# Patient Record
Sex: MALE | Race: WHITE | Employment: FULL TIME | URBAN - METROPOLITAN AREA
[De-identification: names, ages, dates, MRNs, and addresses within clinical notes are randomized per-mention and may not be internally consistent; named-entity substitution may affect disease eponyms.]

---

## 2017-09-07 ENCOUNTER — OFFICE VISIT (OUTPATIENT)
Dept: FAMILY MEDICINE CLINIC | Age: 72
End: 2017-09-07

## 2017-09-07 VITALS
DIASTOLIC BLOOD PRESSURE: 68 MMHG | WEIGHT: 196 LBS | OXYGEN SATURATION: 98 % | BODY MASS INDEX: 26.55 KG/M2 | HEIGHT: 72 IN | HEART RATE: 75 BPM | SYSTOLIC BLOOD PRESSURE: 116 MMHG

## 2017-09-07 DIAGNOSIS — I25.10 ASCVD (ARTERIOSCLEROTIC CARDIOVASCULAR DISEASE): ICD-10-CM

## 2017-09-07 DIAGNOSIS — E78.5 HYPERLIPIDEMIA, UNSPECIFIED HYPERLIPIDEMIA TYPE: Primary | ICD-10-CM

## 2017-09-07 DIAGNOSIS — N40.0 BENIGN PROSTATIC HYPERPLASIA WITHOUT LOWER URINARY TRACT SYMPTOMS, UNSPECIFIED MORPHOLOGY: ICD-10-CM

## 2017-09-07 DIAGNOSIS — Z11.59 ENCOUNTER FOR HEPATITIS C SCREENING TEST FOR LOW RISK PATIENT: ICD-10-CM

## 2017-09-07 DIAGNOSIS — I10 ESSENTIAL HYPERTENSION: ICD-10-CM

## 2017-09-07 DIAGNOSIS — R73.01 ELEVATED FASTING GLUCOSE: ICD-10-CM

## 2017-09-07 LAB
A/G RATIO: 2 (ref 1.1–2.2)
ALBUMIN SERPL-MCNC: 4.2 G/DL (ref 3.4–5)
ALP BLD-CCNC: 74 U/L (ref 40–129)
ALT SERPL-CCNC: 25 U/L (ref 10–40)
ANION GAP SERPL CALCULATED.3IONS-SCNC: 14 MMOL/L (ref 3–16)
AST SERPL-CCNC: 22 U/L (ref 15–37)
BILIRUB SERPL-MCNC: <0.2 MG/DL (ref 0–1)
BUN BLDV-MCNC: 34 MG/DL (ref 7–20)
CALCIUM SERPL-MCNC: 9.2 MG/DL (ref 8.3–10.6)
CHLORIDE BLD-SCNC: 104 MMOL/L (ref 99–110)
CHOLESTEROL, TOTAL: 168 MG/DL (ref 0–199)
CO2: 23 MMOL/L (ref 21–32)
CREAT SERPL-MCNC: 1.1 MG/DL (ref 0.8–1.3)
GFR AFRICAN AMERICAN: >60
GFR NON-AFRICAN AMERICAN: >60
GLOBULIN: 2.1 G/DL
GLUCOSE BLD-MCNC: 113 MG/DL (ref 70–99)
HAV IGM SER IA-ACNC: NORMAL
HDLC SERPL-MCNC: 67 MG/DL (ref 40–60)
HEPATITIS B CORE IGM ANTIBODY: NORMAL
HEPATITIS B SURFACE ANTIGEN INTERPRETATION: NORMAL
HEPATITIS C ANTIBODY INTERPRETATION: NORMAL
LDL CHOLESTEROL CALCULATED: 88 MG/DL
POTASSIUM SERPL-SCNC: 5.2 MMOL/L (ref 3.5–5.1)
SODIUM BLD-SCNC: 141 MMOL/L (ref 136–145)
TOTAL PROTEIN: 6.3 G/DL (ref 6.4–8.2)
TRIGL SERPL-MCNC: 63 MG/DL (ref 0–150)
VLDLC SERPL CALC-MCNC: 13 MG/DL

## 2017-09-07 PROCEDURE — 1123F ACP DISCUSS/DSCN MKR DOCD: CPT | Performed by: FAMILY MEDICINE

## 2017-09-07 PROCEDURE — 1036F TOBACCO NON-USER: CPT | Performed by: FAMILY MEDICINE

## 2017-09-07 PROCEDURE — 3017F COLORECTAL CA SCREEN DOC REV: CPT | Performed by: FAMILY MEDICINE

## 2017-09-07 PROCEDURE — G8419 CALC BMI OUT NRM PARAM NOF/U: HCPCS | Performed by: FAMILY MEDICINE

## 2017-09-07 PROCEDURE — 36415 COLL VENOUS BLD VENIPUNCTURE: CPT | Performed by: FAMILY MEDICINE

## 2017-09-07 PROCEDURE — G8598 ASA/ANTIPLAT THER USED: HCPCS | Performed by: FAMILY MEDICINE

## 2017-09-07 PROCEDURE — G8427 DOCREV CUR MEDS BY ELIG CLIN: HCPCS | Performed by: FAMILY MEDICINE

## 2017-09-07 PROCEDURE — 99214 OFFICE O/P EST MOD 30 MIN: CPT | Performed by: FAMILY MEDICINE

## 2017-09-07 PROCEDURE — 4040F PNEUMOC VAC/ADMIN/RCVD: CPT | Performed by: FAMILY MEDICINE

## 2017-09-07 RX ORDER — LISINOPRIL 40 MG/1
TABLET ORAL
Qty: 30 TABLET | Refills: 11 | Status: SHIPPED | OUTPATIENT
Start: 2017-09-07 | End: 2018-09-11 | Stop reason: SDUPTHER

## 2017-09-07 ASSESSMENT — PATIENT HEALTH QUESTIONNAIRE - PHQ9
1. LITTLE INTEREST OR PLEASURE IN DOING THINGS: 0
SUM OF ALL RESPONSES TO PHQ9 QUESTIONS 1 & 2: 0
2. FEELING DOWN, DEPRESSED OR HOPELESS: 0
SUM OF ALL RESPONSES TO PHQ QUESTIONS 1-9: 0

## 2017-09-08 LAB
ESTIMATED AVERAGE GLUCOSE: 99.7 MG/DL
HBA1C MFR BLD: 5.1 %

## 2017-09-12 ASSESSMENT — ENCOUNTER SYMPTOMS
COUGH: 0
EYE DISCHARGE: 0
DIARRHEA: 0
BLOOD IN STOOL: 0
ABDOMINAL PAIN: 0
ABDOMINAL DISTENTION: 0
CHEST TIGHTNESS: 0
CONSTIPATION: 0
SHORTNESS OF BREATH: 0
ANAL BLEEDING: 0

## 2017-09-18 ENCOUNTER — NURSE ONLY (OUTPATIENT)
Dept: FAMILY MEDICINE CLINIC | Age: 72
End: 2017-09-18

## 2017-09-18 DIAGNOSIS — E87.5 SERUM POTASSIUM ELEVATED: Primary | ICD-10-CM

## 2017-09-18 LAB
ANION GAP SERPL CALCULATED.3IONS-SCNC: 13 MMOL/L (ref 3–16)
BUN BLDV-MCNC: 39 MG/DL (ref 7–20)
CALCIUM SERPL-MCNC: 8.8 MG/DL (ref 8.3–10.6)
CHLORIDE BLD-SCNC: 104 MMOL/L (ref 99–110)
CO2: 24 MMOL/L (ref 21–32)
CREAT SERPL-MCNC: 1 MG/DL (ref 0.8–1.3)
GFR AFRICAN AMERICAN: >60
GFR NON-AFRICAN AMERICAN: >60
GLUCOSE BLD-MCNC: 111 MG/DL (ref 70–99)
POTASSIUM SERPL-SCNC: 4.5 MMOL/L (ref 3.5–5.1)
SODIUM BLD-SCNC: 141 MMOL/L (ref 136–145)

## 2017-09-18 PROCEDURE — 36415 COLL VENOUS BLD VENIPUNCTURE: CPT | Performed by: FAMILY MEDICINE

## 2018-04-04 ENCOUNTER — OFFICE VISIT (OUTPATIENT)
Dept: FAMILY MEDICINE CLINIC | Age: 73
End: 2018-04-04

## 2018-04-04 VITALS
WEIGHT: 203 LBS | TEMPERATURE: 98.1 F | BODY MASS INDEX: 27.53 KG/M2 | DIASTOLIC BLOOD PRESSURE: 74 MMHG | HEART RATE: 74 BPM | SYSTOLIC BLOOD PRESSURE: 116 MMHG

## 2018-04-04 DIAGNOSIS — Z01.818 PREOP EXAMINATION: Primary | ICD-10-CM

## 2018-04-04 PROCEDURE — 99213 OFFICE O/P EST LOW 20 MIN: CPT | Performed by: NURSE PRACTITIONER

## 2018-04-04 PROCEDURE — 3017F COLORECTAL CA SCREEN DOC REV: CPT | Performed by: NURSE PRACTITIONER

## 2018-04-04 PROCEDURE — G8427 DOCREV CUR MEDS BY ELIG CLIN: HCPCS | Performed by: NURSE PRACTITIONER

## 2018-04-04 PROCEDURE — G8419 CALC BMI OUT NRM PARAM NOF/U: HCPCS | Performed by: NURSE PRACTITIONER

## 2018-04-19 ENCOUNTER — HOSPITAL ENCOUNTER (OUTPATIENT)
Dept: SURGERY | Age: 73
Discharge: HOME | End: 2018-04-19
Attending: OPHTHALMOLOGY | Admitting: OPHTHALMOLOGY

## 2018-04-19 VITALS
HEIGHT: 72 IN | DIASTOLIC BLOOD PRESSURE: 79 MMHG | SYSTOLIC BLOOD PRESSURE: 142 MMHG | TEMPERATURE: 98.2 F | BODY MASS INDEX: 27.5 KG/M2 | HEART RATE: 64 BPM | RESPIRATION RATE: 14 BRPM | WEIGHT: 203 LBS | OXYGEN SATURATION: 96 %

## 2018-04-19 RX ORDER — TOBRAMYCIN AND DEXAMETHASONE 3; 1 MG/ML; MG/ML
1 SUSPENSION/ DROPS OPHTHALMIC SEE ADMIN INSTRUCTIONS
Status: DISCONTINUED | OUTPATIENT
Start: 2018-04-19 | End: 2018-04-20 | Stop reason: HOSPADM

## 2018-04-19 RX ORDER — SODIUM CHLORIDE 0.9 % (FLUSH) 0.9 %
10 SYRINGE (ML) INJECTION PRN
Status: DISCONTINUED | OUTPATIENT
Start: 2018-04-19 | End: 2018-04-20 | Stop reason: HOSPADM

## 2018-04-19 RX ORDER — LIDOCAINE HYDROCHLORIDE 10 MG/ML
0.3 INJECTION, SOLUTION EPIDURAL; INFILTRATION; INTRACAUDAL; PERINEURAL
Status: COMPLETED | OUTPATIENT
Start: 2018-04-19 | End: 2018-04-19

## 2018-04-19 RX ORDER — SODIUM CHLORIDE 0.9 % (FLUSH) 0.9 %
10 SYRINGE (ML) INJECTION EVERY 12 HOURS SCHEDULED
Status: DISCONTINUED | OUTPATIENT
Start: 2018-04-19 | End: 2018-04-20 | Stop reason: HOSPADM

## 2018-04-19 RX ORDER — PREDNISOLONE ACETATE 10 MG/ML
1 SUSPENSION/ DROPS OPHTHALMIC SEE ADMIN INSTRUCTIONS
Status: DISCONTINUED | OUTPATIENT
Start: 2018-04-19 | End: 2018-04-20 | Stop reason: HOSPADM

## 2018-04-19 RX ORDER — SODIUM CHLORIDE, SODIUM LACTATE, POTASSIUM CHLORIDE, CALCIUM CHLORIDE 600; 310; 30; 20 MG/100ML; MG/100ML; MG/100ML; MG/100ML
INJECTION, SOLUTION INTRAVENOUS CONTINUOUS
Status: DISCONTINUED | OUTPATIENT
Start: 2018-04-19 | End: 2018-04-20 | Stop reason: HOSPADM

## 2018-04-19 RX ADMIN — SODIUM CHLORIDE, SODIUM LACTATE, POTASSIUM CHLORIDE, CALCIUM CHLORIDE: 600; 310; 30; 20 INJECTION, SOLUTION INTRAVENOUS at 11:12

## 2018-04-19 RX ADMIN — LIDOCAINE HYDROCHLORIDE 0.1 ML: 10 INJECTION, SOLUTION EPIDURAL; INFILTRATION; INTRACAUDAL; PERINEURAL at 11:12

## 2018-04-19 ASSESSMENT — PAIN - FUNCTIONAL ASSESSMENT: PAIN_FUNCTIONAL_ASSESSMENT: 0-10

## 2018-04-19 ASSESSMENT — PAIN SCALES - GENERAL
PAINLEVEL_OUTOF10: 0
PAINLEVEL_OUTOF10: 0

## 2018-05-17 ENCOUNTER — HOSPITAL ENCOUNTER (OUTPATIENT)
Dept: SURGERY | Age: 73
Discharge: HOME | End: 2018-05-17
Attending: OPHTHALMOLOGY | Admitting: OPHTHALMOLOGY

## 2018-05-17 VITALS
OXYGEN SATURATION: 99 % | HEIGHT: 72 IN | RESPIRATION RATE: 18 BRPM | WEIGHT: 203 LBS | BODY MASS INDEX: 27.5 KG/M2 | SYSTOLIC BLOOD PRESSURE: 113 MMHG | HEART RATE: 61 BPM | DIASTOLIC BLOOD PRESSURE: 67 MMHG | TEMPERATURE: 97 F

## 2018-05-17 RX ORDER — SODIUM CHLORIDE, SODIUM LACTATE, POTASSIUM CHLORIDE, CALCIUM CHLORIDE 600; 310; 30; 20 MG/100ML; MG/100ML; MG/100ML; MG/100ML
INJECTION, SOLUTION INTRAVENOUS CONTINUOUS
Status: DISCONTINUED | OUTPATIENT
Start: 2018-05-17 | End: 2018-05-18 | Stop reason: HOSPADM

## 2018-05-17 RX ORDER — MORPHINE SULFATE 10 MG/ML
2 INJECTION, SOLUTION INTRAMUSCULAR; INTRAVENOUS EVERY 5 MIN PRN
Status: DISCONTINUED | OUTPATIENT
Start: 2018-05-17 | End: 2018-05-18 | Stop reason: HOSPADM

## 2018-05-17 RX ORDER — MEPERIDINE HYDROCHLORIDE 25 MG/ML
12.5 INJECTION INTRAMUSCULAR; INTRAVENOUS; SUBCUTANEOUS EVERY 5 MIN PRN
Status: DISCONTINUED | OUTPATIENT
Start: 2018-05-17 | End: 2018-05-18 | Stop reason: HOSPADM

## 2018-05-17 RX ORDER — DIPHENHYDRAMINE HYDROCHLORIDE 50 MG/ML
12.5 INJECTION INTRAMUSCULAR; INTRAVENOUS
Status: ACTIVE | OUTPATIENT
Start: 2018-05-17 | End: 2018-05-17

## 2018-05-17 RX ORDER — TOBRAMYCIN AND DEXAMETHASONE 3; 1 MG/ML; MG/ML
1 SUSPENSION/ DROPS OPHTHALMIC SEE ADMIN INSTRUCTIONS
Status: DISCONTINUED | OUTPATIENT
Start: 2018-05-17 | End: 2018-05-18 | Stop reason: HOSPADM

## 2018-05-17 RX ORDER — OXYCODONE HYDROCHLORIDE AND ACETAMINOPHEN 5; 325 MG/1; MG/1
2 TABLET ORAL PRN
Status: ACTIVE | OUTPATIENT
Start: 2018-05-17 | End: 2018-05-17

## 2018-05-17 RX ORDER — LIDOCAINE HYDROCHLORIDE 10 MG/ML
1 INJECTION, SOLUTION EPIDURAL; INFILTRATION; INTRACAUDAL; PERINEURAL
Status: COMPLETED | OUTPATIENT
Start: 2018-05-17 | End: 2018-05-17

## 2018-05-17 RX ORDER — HYDRALAZINE HYDROCHLORIDE 20 MG/ML
5 INJECTION INTRAMUSCULAR; INTRAVENOUS EVERY 10 MIN PRN
Status: DISCONTINUED | OUTPATIENT
Start: 2018-05-17 | End: 2018-05-18 | Stop reason: HOSPADM

## 2018-05-17 RX ORDER — PROMETHAZINE HYDROCHLORIDE 25 MG/ML
6.25 INJECTION, SOLUTION INTRAMUSCULAR; INTRAVENOUS
Status: DISCONTINUED | OUTPATIENT
Start: 2018-05-17 | End: 2018-05-18 | Stop reason: HOSPADM

## 2018-05-17 RX ORDER — MORPHINE SULFATE 4 MG/ML
1 INJECTION, SOLUTION INTRAMUSCULAR; INTRAVENOUS EVERY 5 MIN PRN
Status: DISCONTINUED | OUTPATIENT
Start: 2018-05-17 | End: 2018-05-18 | Stop reason: HOSPADM

## 2018-05-17 RX ORDER — HYDROMORPHONE HCL 110MG/55ML
0.25 PATIENT CONTROLLED ANALGESIA SYRINGE INTRAVENOUS EVERY 5 MIN PRN
Status: DISCONTINUED | OUTPATIENT
Start: 2018-05-17 | End: 2018-05-18 | Stop reason: HOSPADM

## 2018-05-17 RX ORDER — HYDROMORPHONE HCL 110MG/55ML
0.5 PATIENT CONTROLLED ANALGESIA SYRINGE INTRAVENOUS EVERY 5 MIN PRN
Status: DISCONTINUED | OUTPATIENT
Start: 2018-05-17 | End: 2018-05-18 | Stop reason: HOSPADM

## 2018-05-17 RX ORDER — LABETALOL HYDROCHLORIDE 5 MG/ML
5 INJECTION, SOLUTION INTRAVENOUS EVERY 10 MIN PRN
Status: DISCONTINUED | OUTPATIENT
Start: 2018-05-17 | End: 2018-05-18 | Stop reason: HOSPADM

## 2018-05-17 RX ORDER — OXYCODONE HYDROCHLORIDE AND ACETAMINOPHEN 5; 325 MG/1; MG/1
1 TABLET ORAL PRN
Status: ACTIVE | OUTPATIENT
Start: 2018-05-17 | End: 2018-05-17

## 2018-05-17 RX ORDER — SODIUM CHLORIDE, SODIUM LACTATE, POTASSIUM CHLORIDE, CALCIUM CHLORIDE 600; 310; 30; 20 MG/100ML; MG/100ML; MG/100ML; MG/100ML
INJECTION, SOLUTION INTRAVENOUS
Status: DISPENSED
Start: 2018-05-17 | End: 2018-05-17

## 2018-05-17 RX ORDER — ONDANSETRON 2 MG/ML
4 INJECTION INTRAMUSCULAR; INTRAVENOUS PRN
Status: DISCONTINUED | OUTPATIENT
Start: 2018-05-17 | End: 2018-05-18 | Stop reason: HOSPADM

## 2018-05-17 RX ORDER — PREDNISOLONE ACETATE 10 MG/ML
1 SUSPENSION/ DROPS OPHTHALMIC SEE ADMIN INSTRUCTIONS
Status: DISCONTINUED | OUTPATIENT
Start: 2018-05-17 | End: 2018-05-18 | Stop reason: HOSPADM

## 2018-05-17 RX ADMIN — SODIUM CHLORIDE, SODIUM LACTATE, POTASSIUM CHLORIDE, CALCIUM CHLORIDE: 600; 310; 30; 20 INJECTION, SOLUTION INTRAVENOUS at 09:19

## 2018-05-17 RX ADMIN — LIDOCAINE HYDROCHLORIDE 1 ML: 10 INJECTION, SOLUTION EPIDURAL; INFILTRATION; INTRACAUDAL; PERINEURAL at 09:19

## 2018-05-17 ASSESSMENT — PAIN SCALES - GENERAL
PAINLEVEL_OUTOF10: 0
PAINLEVEL_OUTOF10: 0

## 2018-05-17 ASSESSMENT — PAIN - FUNCTIONAL ASSESSMENT: PAIN_FUNCTIONAL_ASSESSMENT: 0-10

## 2018-09-11 ENCOUNTER — OFFICE VISIT (OUTPATIENT)
Dept: FAMILY MEDICINE CLINIC | Age: 73
End: 2018-09-11

## 2018-09-11 VITALS
HEART RATE: 72 BPM | BODY MASS INDEX: 26.14 KG/M2 | SYSTOLIC BLOOD PRESSURE: 116 MMHG | OXYGEN SATURATION: 98 % | WEIGHT: 193 LBS | DIASTOLIC BLOOD PRESSURE: 70 MMHG | HEIGHT: 72 IN | TEMPERATURE: 98.5 F

## 2018-09-11 DIAGNOSIS — I10 ESSENTIAL HYPERTENSION: Primary | ICD-10-CM

## 2018-09-11 LAB
A/G RATIO: 2.1 (ref 1.1–2.2)
ALBUMIN SERPL-MCNC: 4.5 G/DL (ref 3.4–5)
ALP BLD-CCNC: 69 U/L (ref 40–129)
ALT SERPL-CCNC: 27 U/L (ref 10–40)
ANION GAP SERPL CALCULATED.3IONS-SCNC: 16 MMOL/L (ref 3–16)
AST SERPL-CCNC: 26 U/L (ref 15–37)
BILIRUB SERPL-MCNC: 0.3 MG/DL (ref 0–1)
BUN BLDV-MCNC: 35 MG/DL (ref 7–20)
CALCIUM SERPL-MCNC: 9.5 MG/DL (ref 8.3–10.6)
CHLORIDE BLD-SCNC: 102 MMOL/L (ref 99–110)
CO2: 19 MMOL/L (ref 21–32)
CREAT SERPL-MCNC: 1.1 MG/DL (ref 0.8–1.3)
GFR AFRICAN AMERICAN: >60
GFR NON-AFRICAN AMERICAN: >60
GLOBULIN: 2.1 G/DL
GLUCOSE BLD-MCNC: 113 MG/DL (ref 70–99)
POTASSIUM SERPL-SCNC: 4.8 MMOL/L (ref 3.5–5.1)
SODIUM BLD-SCNC: 137 MMOL/L (ref 136–145)
TOTAL PROTEIN: 6.6 G/DL (ref 6.4–8.2)

## 2018-09-11 PROCEDURE — 99213 OFFICE O/P EST LOW 20 MIN: CPT | Performed by: NURSE PRACTITIONER

## 2018-09-11 PROCEDURE — G8419 CALC BMI OUT NRM PARAM NOF/U: HCPCS | Performed by: NURSE PRACTITIONER

## 2018-09-11 PROCEDURE — 36415 COLL VENOUS BLD VENIPUNCTURE: CPT | Performed by: NURSE PRACTITIONER

## 2018-09-11 PROCEDURE — 3017F COLORECTAL CA SCREEN DOC REV: CPT | Performed by: NURSE PRACTITIONER

## 2018-09-11 PROCEDURE — 1101F PT FALLS ASSESS-DOCD LE1/YR: CPT | Performed by: NURSE PRACTITIONER

## 2018-09-11 PROCEDURE — G8510 SCR DEP NEG, NO PLAN REQD: HCPCS | Performed by: NURSE PRACTITIONER

## 2018-09-11 PROCEDURE — 3288F FALL RISK ASSESSMENT DOCD: CPT | Performed by: NURSE PRACTITIONER

## 2018-09-11 PROCEDURE — 1123F ACP DISCUSS/DSCN MKR DOCD: CPT | Performed by: NURSE PRACTITIONER

## 2018-09-11 PROCEDURE — G8427 DOCREV CUR MEDS BY ELIG CLIN: HCPCS | Performed by: NURSE PRACTITIONER

## 2018-09-11 PROCEDURE — 4040F PNEUMOC VAC/ADMIN/RCVD: CPT | Performed by: NURSE PRACTITIONER

## 2018-09-11 PROCEDURE — 1036F TOBACCO NON-USER: CPT | Performed by: NURSE PRACTITIONER

## 2018-09-11 RX ORDER — LISINOPRIL 40 MG/1
TABLET ORAL
Qty: 30 TABLET | Refills: 11 | Status: SHIPPED | OUTPATIENT
Start: 2018-09-11 | End: 2019-09-11 | Stop reason: SDUPTHER

## 2018-09-11 ASSESSMENT — PATIENT HEALTH QUESTIONNAIRE - PHQ9
SUM OF ALL RESPONSES TO PHQ QUESTIONS 1-9: 0
SUM OF ALL RESPONSES TO PHQ9 QUESTIONS 1 & 2: 0
2. FEELING DOWN, DEPRESSED OR HOPELESS: 0
1. LITTLE INTEREST OR PLEASURE IN DOING THINGS: 0
SUM OF ALL RESPONSES TO PHQ QUESTIONS 1-9: 0

## 2018-09-11 ASSESSMENT — ENCOUNTER SYMPTOMS
SHORTNESS OF BREATH: 0
DOUBLE VISION: 0
BLURRED VISION: 0

## 2018-09-11 NOTE — PROGRESS NOTES
Patient: Josue Acotsa is a 68 y.o. male who presents today with the following Chief Complaint(s):  Chief Complaint   Patient presents with   [address] pt         HPI     Laura Solorzano is a 67 yo white male who is here for a blood pressure med check and to establish care. Denies any SOB and headaches, chest pain, dizziness. Current Outpatient Prescriptions   Medication Sig Dispense Refill    lisinopril (PRINIVIL;ZESTRIL) 40 MG tablet TAKE ONE TABLET BY MOUTH DAILY 30 tablet 11    aspirin 81 MG tablet Take 81 mg by mouth daily      COCONUT OIL PO Take by mouth      GARLIC PO Take 1 tablet by mouth 2 times daily      TURMERIC PO Take 1 tablet by mouth daily      B Complex Vitamins (VITAMIN-B COMPLEX) TABS Take 1 tablet by mouth daily      Coenzyme Q10 (COQ10 PO) Take 1 tablet by mouth 2 times daily      ALPHA LIPOIC ACID PO Take 1 tablet by mouth 2 times daily      ibuprofen (ADVIL;MOTRIN) 600 MG tablet Take 1 tablet by mouth every 6 hours as needed for Pain. 40 tablet 0    Vitamin D (CHOLECALCIFEROL) 1000 UNITS CAPS capsule Take 1,000 Units by mouth daily.  fish oil-omega-3 fatty acids 1000 MG capsule Take 2 g by mouth daily. No current facility-administered medications for this visit. Patient's past medical history, surgical history, family history, medications,  and allergies  were all reviewed and updated as appropriate today. Review of Systems   Constitutional: Negative for chills and fever. Eyes: Negative for blurred vision and double vision. Respiratory: Negative for shortness of breath. Cardiovascular: Negative for chest pain and palpitations. Neurological: Negative for headaches. Physical Exam   Constitutional: He is oriented to person, place, and time. Vital signs are normal. He appears well-developed and well-nourished. HENT:   Head: Normocephalic and atraumatic.    Right Ear: Tympanic membrane normal.   Left Ear: Tympanic membrane normal.   Nose: Nose normal.   Mouth/Throat: Oropharynx is clear and moist. No oropharyngeal exudate, posterior oropharyngeal edema or posterior oropharyngeal erythema. Eyes: Conjunctivae are normal.   Neck: Normal range of motion. Cardiovascular: Normal rate, regular rhythm and normal heart sounds. No murmur heard. Pulmonary/Chest: Effort normal and breath sounds normal. No respiratory distress. He has no wheezes. He has no rales. Musculoskeletal: Normal range of motion. Neurological: He is alert and oriented to person, place, and time. Skin: Skin is warm and dry. Psychiatric: He has a normal mood and affect. His behavior is normal. Judgment and thought content normal.   Nursing note and vitals reviewed. Vitals:    09/11/18 1048   BP: 116/70   Pulse: 72   Temp: 98.5 °F (36.9 °C)   SpO2: 98%       Assessment:  Encounter Diagnosis   Name Primary?  Essential hypertension Yes       Plan:  1. Essential hypertension    - lisinopril (PRINIVIL;ZESTRIL) 40 MG tablet; TAKE ONE TABLET BY MOUTH DAILY  Dispense: 30 tablet;  Refill: 11    Declines flu vaccine

## 2019-09-11 ENCOUNTER — OFFICE VISIT (OUTPATIENT)
Dept: FAMILY MEDICINE CLINIC | Age: 74
End: 2019-09-11
Payer: MEDICARE

## 2019-09-11 VITALS
HEIGHT: 72 IN | TEMPERATURE: 98.2 F | OXYGEN SATURATION: 96 % | SYSTOLIC BLOOD PRESSURE: 120 MMHG | WEIGHT: 189 LBS | BODY MASS INDEX: 25.6 KG/M2 | HEART RATE: 62 BPM | DIASTOLIC BLOOD PRESSURE: 64 MMHG

## 2019-09-11 DIAGNOSIS — I10 ESSENTIAL HYPERTENSION: ICD-10-CM

## 2019-09-11 DIAGNOSIS — Z00.00 ROUTINE GENERAL MEDICAL EXAMINATION AT A HEALTH CARE FACILITY: ICD-10-CM

## 2019-09-11 DIAGNOSIS — Z00.00 MEDICARE ANNUAL WELLNESS VISIT, INITIAL: Primary | ICD-10-CM

## 2019-09-11 PROCEDURE — 3017F COLORECTAL CA SCREEN DOC REV: CPT | Performed by: NURSE PRACTITIONER

## 2019-09-11 PROCEDURE — G0438 PPPS, INITIAL VISIT: HCPCS | Performed by: NURSE PRACTITIONER

## 2019-09-11 PROCEDURE — 1123F ACP DISCUSS/DSCN MKR DOCD: CPT | Performed by: NURSE PRACTITIONER

## 2019-09-11 PROCEDURE — 4040F PNEUMOC VAC/ADMIN/RCVD: CPT | Performed by: NURSE PRACTITIONER

## 2019-09-11 RX ORDER — LISINOPRIL 40 MG/1
TABLET ORAL
Qty: 90 TABLET | Refills: 3 | Status: SHIPPED | OUTPATIENT
Start: 2019-09-11 | End: 2020-07-02 | Stop reason: SDUPTHER

## 2019-09-11 ASSESSMENT — PATIENT HEALTH QUESTIONNAIRE - PHQ9
SUM OF ALL RESPONSES TO PHQ9 QUESTIONS 1 & 2: 0
2. FEELING DOWN, DEPRESSED OR HOPELESS: 0
SUM OF ALL RESPONSES TO PHQ QUESTIONS 1-9: 0
SUM OF ALL RESPONSES TO PHQ QUESTIONS 1-9: 0
1. LITTLE INTEREST OR PLEASURE IN DOING THINGS: 0

## 2019-09-11 ASSESSMENT — LIFESTYLE VARIABLES: HOW OFTEN DO YOU HAVE A DRINK CONTAINING ALCOHOL: 0

## 2019-12-31 ENCOUNTER — HOSPITAL ENCOUNTER (EMERGENCY)
Age: 74
Discharge: HOME | End: 2019-12-31
Attending: EMERGENCY MEDICINE
Payer: MEDICARE

## 2019-12-31 ENCOUNTER — APPOINTMENT (OUTPATIENT)
Dept: GENERAL RADIOLOGY | Age: 74
End: 2019-12-31
Payer: MEDICARE

## 2019-12-31 VITALS
HEART RATE: 70 BPM | SYSTOLIC BLOOD PRESSURE: 96 MMHG | WEIGHT: 185 LBS | OXYGEN SATURATION: 100 % | TEMPERATURE: 98.2 F | DIASTOLIC BLOOD PRESSURE: 59 MMHG | BODY MASS INDEX: 25.06 KG/M2 | HEIGHT: 72 IN | RESPIRATION RATE: 18 BRPM

## 2019-12-31 LAB
A/G RATIO: 1.5 (ref 1.1–2.2)
ALBUMIN SERPL-MCNC: 4.4 G/DL (ref 3.4–5)
ALP BLD-CCNC: 77 U/L (ref 40–129)
ALT SERPL-CCNC: 19 U/L (ref 10–40)
ANION GAP SERPL CALCULATED.3IONS-SCNC: 11 MMOL/L (ref 3–16)
ANION GAP SERPL CALCULATED.3IONS-SCNC: 15 MMOL/L (ref 3–16)
AST SERPL-CCNC: 19 U/L (ref 15–37)
BASOPHILS ABSOLUTE: 0.1 K/UL (ref 0–0.2)
BASOPHILS RELATIVE PERCENT: 0.8 %
BILIRUB SERPL-MCNC: <0.2 MG/DL (ref 0–1)
BUN BLDV-MCNC: 31 MG/DL (ref 7–20)
BUN BLDV-MCNC: 39 MG/DL (ref 7–20)
CALCIUM SERPL-MCNC: 8.2 MG/DL (ref 8.3–10.6)
CALCIUM SERPL-MCNC: 9.5 MG/DL (ref 8.3–10.6)
CHLORIDE BLD-SCNC: 101 MMOL/L (ref 99–110)
CHLORIDE BLD-SCNC: 110 MMOL/L (ref 99–110)
CO2: 17 MMOL/L (ref 21–32)
CO2: 18 MMOL/L (ref 21–32)
CREAT SERPL-MCNC: 1.1 MG/DL (ref 0.8–1.3)
CREAT SERPL-MCNC: 1.4 MG/DL (ref 0.8–1.3)
EKG ATRIAL RATE: 326 BPM
EKG DIAGNOSIS: NORMAL
EKG P AXIS: 97 DEGREES
EKG Q-T INTERVAL: 300 MS
EKG QRS DURATION: 102 MS
EKG QTC CALCULATION (BAZETT): 494 MS
EKG R AXIS: 33 DEGREES
EKG T AXIS: -74 DEGREES
EKG VENTRICULAR RATE: 163 BPM
EOSINOPHILS ABSOLUTE: 0 K/UL (ref 0–0.6)
EOSINOPHILS RELATIVE PERCENT: 0.2 %
GFR AFRICAN AMERICAN: >60
GFR AFRICAN AMERICAN: >60
GFR NON-AFRICAN AMERICAN: 50
GFR NON-AFRICAN AMERICAN: >60
GLOBULIN: 2.9 G/DL
GLUCOSE BLD-MCNC: 142 MG/DL (ref 70–99)
GLUCOSE BLD-MCNC: 93 MG/DL (ref 70–99)
HCT VFR BLD CALC: 41.3 % (ref 40.5–52.5)
HEMOGLOBIN: 13.9 G/DL (ref 13.5–17.5)
LYMPHOCYTES ABSOLUTE: 1.4 K/UL (ref 1–5.1)
LYMPHOCYTES RELATIVE PERCENT: 11.8 %
MAGNESIUM: 2.1 MG/DL (ref 1.8–2.4)
MCH RBC QN AUTO: 31.2 PG (ref 26–34)
MCHC RBC AUTO-ENTMCNC: 33.7 G/DL (ref 31–36)
MCV RBC AUTO: 92.4 FL (ref 80–100)
MONOCYTES ABSOLUTE: 0.6 K/UL (ref 0–1.3)
MONOCYTES RELATIVE PERCENT: 5.3 %
NEUTROPHILS ABSOLUTE: 9.9 K/UL (ref 1.7–7.7)
NEUTROPHILS RELATIVE PERCENT: 81.9 %
PDW BLD-RTO: 13.2 % (ref 12.4–15.4)
PHOSPHORUS: 4.4 MG/DL (ref 2.5–4.9)
PLATELET # BLD: 324 K/UL (ref 135–450)
PMV BLD AUTO: 8.1 FL (ref 5–10.5)
POTASSIUM SERPL-SCNC: 4.6 MMOL/L (ref 3.5–5.1)
POTASSIUM SERPL-SCNC: 4.9 MMOL/L (ref 3.5–5.1)
RBC # BLD: 4.47 M/UL (ref 4.2–5.9)
SODIUM BLD-SCNC: 133 MMOL/L (ref 136–145)
SODIUM BLD-SCNC: 139 MMOL/L (ref 136–145)
TOTAL PROTEIN: 7.3 G/DL (ref 6.4–8.2)
TROPONIN: <0.01 NG/ML
TROPONIN: <0.01 NG/ML
WBC # BLD: 12.1 K/UL (ref 4–11)

## 2019-12-31 PROCEDURE — 85025 COMPLETE CBC W/AUTO DIFF WBC: CPT

## 2019-12-31 PROCEDURE — 99285 EMERGENCY DEPT VISIT HI MDM: CPT

## 2019-12-31 PROCEDURE — 96374 THER/PROPH/DIAG INJ IV PUSH: CPT

## 2019-12-31 PROCEDURE — 93005 ELECTROCARDIOGRAM TRACING: CPT | Performed by: EMERGENCY MEDICINE

## 2019-12-31 PROCEDURE — 80053 COMPREHEN METABOLIC PANEL: CPT

## 2019-12-31 PROCEDURE — 84100 ASSAY OF PHOSPHORUS: CPT

## 2019-12-31 PROCEDURE — 93010 ELECTROCARDIOGRAM REPORT: CPT | Performed by: INTERNAL MEDICINE

## 2019-12-31 PROCEDURE — 6360000002 HC RX W HCPCS

## 2019-12-31 PROCEDURE — 96361 HYDRATE IV INFUSION ADD-ON: CPT

## 2019-12-31 PROCEDURE — 83735 ASSAY OF MAGNESIUM: CPT

## 2019-12-31 PROCEDURE — 2580000003 HC RX 258: Performed by: EMERGENCY MEDICINE

## 2019-12-31 PROCEDURE — 84484 ASSAY OF TROPONIN QUANT: CPT

## 2019-12-31 PROCEDURE — 71045 X-RAY EXAM CHEST 1 VIEW: CPT

## 2019-12-31 RX ORDER — ONDANSETRON 2 MG/ML
4 INJECTION INTRAMUSCULAR; INTRAVENOUS
Status: DISCONTINUED | OUTPATIENT
Start: 2019-12-31 | End: 2019-12-31 | Stop reason: HOSPADM

## 2019-12-31 RX ORDER — 0.9 % SODIUM CHLORIDE 0.9 %
1000 INTRAVENOUS SOLUTION INTRAVENOUS ONCE
Status: COMPLETED | OUTPATIENT
Start: 2019-12-31 | End: 2019-12-31

## 2019-12-31 RX ORDER — ONDANSETRON 2 MG/ML
INJECTION INTRAMUSCULAR; INTRAVENOUS
Status: COMPLETED
Start: 2019-12-31 | End: 2019-12-31

## 2019-12-31 RX ADMIN — ONDANSETRON HYDROCHLORIDE 4 MG: 2 INJECTION, SOLUTION INTRAMUSCULAR; INTRAVENOUS at 11:11

## 2019-12-31 RX ADMIN — SODIUM CHLORIDE 1000 ML: 9 INJECTION, SOLUTION INTRAVENOUS at 11:10

## 2019-12-31 RX ADMIN — ONDANSETRON 4 MG: 2 INJECTION INTRAMUSCULAR; INTRAVENOUS at 11:11

## 2019-12-31 RX ADMIN — SODIUM CHLORIDE 1000 ML: 9 INJECTION, SOLUTION INTRAVENOUS at 10:05

## 2019-12-31 NOTE — LETTER
[place] [facility] [place] PHYSICAL [facility] ED  [address] [address]  Phone: [phone]               December 31, 2019    Patient: Vijay Muñoz   YOB: 1945   Date of Visit: 12/31/2019       To Whom It May Concern:    Tin Bullard was seen and treated in our emergency department on 12/31/2019. He may return to work on 1/2/20.       Sincerely,       Moises Cleveland MD         Signature:__________________________________

## 2019-12-31 NOTE — ED PROVIDER NOTES
[address] ED      CHIEF COMPLAINT  Shortness of Breath (Pt states that he has been getting really short of breath and dizzy with any littel exertion. )       HISTORY OF PRESENT ILLNESS  Yulia Perez is a 69 y.o. male  who presents to the ED complaining of shortness of breath and lightheadedness. Patient states that he started with the symptoms last night and then this morning tried to go to work but anytime he got up to walk he felt very lightheaded as if he was going to pass out but has not had any syncope or loss of consciousness. He denies any chest pain. No palpitations or feeling that his heart is racing. He has never had anything like this before and has no history of SVT or atrial fibrillation. He does have a history of hypertension and is on medication for that. No additional cardiac history or history of coronary disease. No leg pain or swelling. No recent travel or surgeries. He does drink a cup of coffee daily and had some this morning with a banana and some oatmeal.  He denies any congestion or recent illness other than mild cough. Patient took his lisinopril this AM.  States his typical BP is between  systolic. No other complaints, modifying factors or associated symptoms. I have reviewed the following from the nursing documentation.     Past Medical History:   Diagnosis Date    Arthritis     BPH (benign prostatic hyperplasia)     Cataract     bilateral    GERD (gastroesophageal reflux disease)     diverticulitis    HLD (hyperlipidemia) 9/18/2013    HTN (hypertension)     Nephrolithiasis     Pruritus     Tear of meniscus of knee     Left    TIA (transient ischemic attack) 9/11    Speech Aphasia     Past Surgical History:   Procedure Laterality Date    APPENDECTOMY      CATARACT REMOVAL WITH IMPLANT Left 04/19/2018    CATARACT REMOVAL WITH IMPLANT Right 05/17/2018    with dr Olivia Nam  before 2005    CYSTOSCOPY      Ureteroscopy    HERNIA REPAIR Right     umbilical    KNEE ARTHROSCOPY      Left    KNEE ARTHROSCOPY  14    right    MANDIBLE SURGERY      mandibular teresita bilateral    PLANTAR FASCIA SURGERY      SHOULDER ARTHROSCOPY      Left rotator cuff    SKIN BIOPSY      TONSILLECTOMY      TURP       Family History   Problem Relation Age of Onset    Heart Disease Father 61    High Blood Pressure Father     Stroke Father     Diabetes Brother     Heart Disease Brother         CHF    Stroke Brother      Social History     Socioeconomic History    Marital status: Single     Spouse name: Not on file    Number of children: Not on file    Years of education: Not on file    Highest education level: Not on file   Occupational History    Not on file   Social Needs    Financial resource strain: Not on file    Food insecurity:     Worry: Not on file     Inability: Not on file    Transportation needs:     Medical: Not on file     Non-medical: Not on file   Tobacco Use    Smoking status: Former Smoker     Packs/day: 2.00     Years: 30.00     Pack years: 60.00     Last attempt to quit: 1997     Years since quittin.0    Smokeless tobacco: Never Used   Substance and Sexual Activity    Alcohol use: No     Comment: quit     Drug use: No    Sexual activity: Not on file   Lifestyle    Physical activity:     Days per week: Not on file     Minutes per session: Not on file    Stress: Not on file   Relationships    Social connections:     Talks on phone: Not on file     Gets together: Not on file     Attends [employer] service: Not on file     Active member of club or organization: Not on file     Attends meetings of clubs or organizations: Not on file     Relationship status: Not on file    Intimate partner violence:     Fear of current or ex partner: Not on file     Emotionally abused: Not on file     Physically abused: Not on file     Forced sexual activity: Not on file   Other Topics Concern    Not on file   Social History Narrative    Not on file     Current Facility-Administered Medications   Medication Dose Route Frequency Provider Last Rate Last Dose    ondansetron (ZOFRAN) injection 4 mg  4 mg Intravenous Q1H PRN Lesli Mcdonald MD   4 mg at 12/31/19 1111     Current Outpatient Medications   Medication Sig Dispense Refill    Probiotic Product (PROBIOTIC DAILY PO) Take by mouth      lisinopril (PRINIVIL;ZESTRIL) 40 MG tablet TAKE ONE TABLET BY MOUTH DAILY 90 tablet 3    COCONUT OIL PO Take by mouth      GARLIC PO Take 1 tablet by mouth 2 times daily      TURMERIC PO Take 1 tablet by mouth daily      B Complex Vitamins (VITAMIN-B COMPLEX) TABS Take 1 tablet by mouth daily      Coenzyme Q10 (COQ10 PO) Take 1 tablet by mouth 2 times daily      ALPHA LIPOIC ACID PO Take 1 tablet by mouth 2 times daily      ibuprofen (ADVIL;MOTRIN) 600 MG tablet Take 1 tablet by mouth every 6 hours as needed for Pain. 40 tablet 0    Vitamin D (CHOLECALCIFEROL) 1000 UNITS CAPS capsule Take 1,000 Units by mouth daily.  fish oil-omega-3 fatty acids 1000 MG capsule Take 2 g by mouth daily. No Known Allergies    REVIEW OF SYSTEMS  10 systems reviewed, pertinent positives per HPI otherwise noted to be negative. PHYSICAL EXAM  BP (!) 96/59   Pulse 70   Temp 98.2 °F (36.8 °C)   Resp 18   Ht 6' (1.829 m)   Wt 185 lb (83.9 kg)   SpO2 100%   BMI 25.09 kg/m²    GENERAL APPEARANCE: Awake and alert. Cooperative. No acute distress. HENT: Normocephalic. Atraumatic. Mucous membranes are moist.  No drooling or stridor. NECK: Supple. No JVD. EYES: PERRL. EOM's grossly intact. HEART/CHEST: Tachycardic but regular rhythm. No murmurs. 2+ radial pulses bilaterally. LUNGS: Respirations unlabored. CTAB. Good air exchange. Speaking comfortably in full sentences. ABDOMEN: No tenderness. Soft. Non-distended. No masses. No organomegaly. No guarding or rebound. MUSCULOSKELETAL: No extremity edema. Compartments soft.   No deformity. No tenderness in the extremities. All extremities neurovascularly intact. SKIN: Warm and dry. No acute rashes. NEUROLOGICAL: Alert and oriented. CN's 2-12 intact. No gross facial drooping. No gross focal deficits. PSYCHIATRIC: Normal mood and affect. LABS  I have reviewed all labs for this visit.    Results for orders placed or performed during the hospital encounter of 12/31/19   CBC Auto Differential   Result Value Ref Range    WBC 12.1 (H) 4.0 - 11.0 K/uL    RBC 4.47 4.20 - 5.90 M/uL    Hemoglobin 13.9 13.5 - 17.5 g/dL    Hematocrit 41.3 40.5 - 52.5 %    MCV 92.4 80.0 - 100.0 fL    MCH 31.2 26.0 - 34.0 pg    MCHC 33.7 31.0 - 36.0 g/dL    RDW 13.2 12.4 - 15.4 %    Platelets 128 381 - 615 K/uL    MPV 8.1 5.0 - 10.5 fL    Neutrophils % 81.9 %    Lymphocytes % 11.8 %    Monocytes % 5.3 %    Eosinophils % 0.2 %    Basophils % 0.8 %    Neutrophils Absolute 9.9 (H) 1.7 - 7.7 K/uL    Lymphocytes Absolute 1.4 1.0 - 5.1 K/uL    Monocytes Absolute 0.6 0.0 - 1.3 K/uL    Eosinophils Absolute 0.0 0.0 - 0.6 K/uL    Basophils Absolute 0.1 0.0 - 0.2 K/uL   Troponin   Result Value Ref Range    Troponin <0.01 <0.01 ng/mL   Comprehensive Metabolic Panel   Result Value Ref Range    Sodium 133 (L) 136 - 145 mmol/L    Potassium 4.9 3.5 - 5.1 mmol/L    Chloride 101 99 - 110 mmol/L    CO2 17 (L) 21 - 32 mmol/L    Anion Gap 15 3 - 16    Glucose 142 (H) 70 - 99 mg/dL    BUN 39 (H) 7 - 20 mg/dL    CREATININE 1.4 (H) 0.8 - 1.3 mg/dL    GFR Non-African American 50 (A) >60    GFR African American >60 >60    Calcium 9.5 8.3 - 10.6 mg/dL    Total Protein 7.3 6.4 - 8.2 g/dL    Alb 4.4 3.4 - 5.0 g/dL    Albumin/Globulin Ratio 1.5 1.1 - 2.2    Total Bilirubin <0.2 0.0 - 1.0 mg/dL    Alkaline Phosphatase 77 40 - 129 U/L    ALT 19 10 - 40 U/L    AST 19 15 - 37 U/L    Globulin 2.9 g/dL   Magnesium   Result Value Ref Range    Magnesium 2.10 1.80 - 2.40 mg/dL   Phosphorus   Result Value Ref Range    Phosphorus 4.4 2.5 - 4.9 mg/dL Troponin   Result Value Ref Range    Troponin <0.01 <0.01 ng/mL   Basic metabolic panel   Result Value Ref Range    Sodium 139 136 - 145 mmol/L    Potassium 4.6 3.5 - 5.1 mmol/L    Chloride 110 99 - 110 mmol/L    CO2 18 (L) 21 - 32 mmol/L    Anion Gap 11 3 - 16    Glucose 93 70 - 99 mg/dL    BUN 31 (H) 7 - 20 mg/dL    CREATININE 1.1 0.8 - 1.3 mg/dL    GFR Non-African American >60 >60    GFR African American >60 >60    Calcium 8.2 (L) 8.3 - 10.6 mg/dL   EKG 12 Lead   Result Value Ref Range    Ventricular Rate 163 BPM    Atrial Rate 326 BPM    QRS Duration 102 ms    Q-T Interval 300 ms    QTc Calculation (Bazett) 494 ms    P Axis 97 degrees    R Axis 33 degrees    T Axis -74 degrees    Diagnosis       Atrial flutter with variable A-V block with premature ventricular or aberrantly conducted complexesST & T wave abnormality, consider inferior ischemiaST & T wave abnormality, consider anterolateral ischemiaAbnormal ECGNo previous ECGs available       ECG  The Ekg interpreted by me shows  Narrow complex tachycardic rhythm consistent with possible SVT with a rate of 163  Axis is   Normal  QTc is  within an acceptable range  Intervals and Durations are unremarkable. ST Segments: nonspecific changes  Sinus rhythm has been replaced by the narrow complex tachycardic rhythm when compared with previous EKG dated 1/3/2014. The Ekg interpreted by me shows (repeat)  normal sinus rhythm with a rate of 91  Axis is   Normal  QTc is  normal  Intervals and Durations are unremarkable. ST Segments: no acute change  Sinus rhythm has replaced the narrow complex tachycardia that was seen earlier today on EKG. RADIOLOGY  Xr Chest Portable    Result Date: 12/31/2019  EXAMINATION: ONE XRAY VIEW OF THE CHEST 12/31/2019 10:06 am COMPARISON: None.  HISTORY: ORDERING SYSTEM PROVIDED HISTORY: svt, lightheadedness TECHNOLOGIST PROVIDED HISTORY: Reason for exam:->svt, lightheadedness Reason for Exam: Heart palpatations Acuity: Acute Type of Exam: Initial FINDINGS: The cardiomediastinal silhouette is normal in size. No evidence of acute infiltrate or pulmonary edema. No pleural effusion or pneumothorax. No acute cardiopulmonary process. ED COURSE/MDM  Patient seen and evaluated. Old records reviewed. Labs and imaging reviewed and results discussed with patient. Patient presenting with tachyarrhythmia which was consistent with SVT. Vagal maneuvers performed while I prepared to potentially give adenosine. I was somewhat concerned about his blood pressures but after speaking with him further his baseline blood pressures are in the 90s and he has taken his lisinopril today. Other than the lightheadedness and slight dyspnea he denied any symptoms, specifically no chest pain. Patient preferred vagal maneuvers and initially no change in rhythm but a very short time after, perhaps 30 seconds to 1 minute he did convert back to sinus rhythm. He remained in sinus rhythm throughout the remainder of his stay. Initial labs unremarkable other than mild MAGALYS and I suspect possibly mild dehydration. Patient was fluid resuscitated on repeat check creatinine is now down to 1.1. Troponin x2 performed at 3-hour intervals were noted to be negative and no acute ischemic changes were noted on EKG. At this time I felt that is reasonable to discharge patient home with close outpatient follow-up as well as cardiology referral for follow-up. Patient was in agreement of the plan and all questions answered at time of discharge. I estimate there is LOW risk for PULMONARY EMBOLISM, ACUTE CORONARY SYNDROME, OR THORACIC AORTIC DISSECTION, thus I consider the discharge disposition reasonable. [address] [place] [address] and I have discussed the diagnosis and risks, and we agree with discharging home to follow-up with their primary doctor. We also discussed returning to the Emergency Department immediately if new or worsening symptoms occur.  We have

## 2020-01-01 LAB
EKG ATRIAL RATE: 91 BPM
EKG DIAGNOSIS: NORMAL
EKG P AXIS: 56 DEGREES
EKG P-R INTERVAL: 166 MS
EKG Q-T INTERVAL: 292 MS
EKG QRS DURATION: 84 MS
EKG QTC CALCULATION (BAZETT): 359 MS
EKG R AXIS: 48 DEGREES
EKG T AXIS: 68 DEGREES
EKG VENTRICULAR RATE: 91 BPM

## 2020-01-15 PROBLEM — R06.02 SOB (SHORTNESS OF BREATH): Status: ACTIVE | Noted: 2020-01-15

## 2020-01-15 NOTE — PROGRESS NOTES
[facility]   CARDIAC EVALUATION NOTE  [phone]      PCP:  KEIRY Delgado CNP    Reason for Consultation/Chief Complaint: SOB, lightheadedness     Subjective   History of Present Illness:  Rosa Spence is a 69 y.o. patient with a history of HTN, HLD, GERD and TIA who presents with SOB. His echo from 09/28/11 was 50-55% with moderate concentric LVH and grade I DD. Left atrial enlargement appears to be mild. Right ventricular enlargement appears to be mild. He presented to the ER on 12/31/19 with SOB and lightheadedness. Trop were negative x2. His EKG showed Aflutter . He reports he got up in the middle of the night and became lightheaded. He has worsening lightheadedness and became SOB. He returned to SR after vagal maneuvers. He has lightheadedness several times a day. He has ongoing fatigue since 12/31/19. He denies CP, recurrent SOB, BLE edema or syncope. He works in the [employer] at [employer]. He is a former smoker. He stopped alcohol several years ago. Past Medical History:   has a past medical history of Arthritis, BPH (benign prostatic hyperplasia), Cataract, GERD (gastroesophageal reflux disease), HLD (hyperlipidemia), HTN (hypertension), Nephrolithiasis, Pruritus, Tear of meniscus of knee, and TIA (transient ischemic attack). Surgical History:   has a past surgical history that includes Cystoscopy; Appendectomy; Tonsillectomy; skin biopsy; Plantar fascia surgery; Knee arthroscopy; Shoulder arthroscopy; Mandible surgery; TURP; hernia repair (Right); Knee arthroscopy (1/7/14); Colonoscopy (before 2005); Cataract removal with implant (Left, 04/19/2018); and Cataract removal with implant (Right, 05/17/2018). Social History:   reports that he quit smoking about 23 years ago. He has a 60.00 pack-year smoking history. He has never used smokeless tobacco. He reports that he does not drink alcohol or use drugs.      Family History:  family history includes Diabetes in symmetrical, trachea midline, no adenopathy, thyroid: not enlarged, symmetric, no tenderness/mass/nodules, no carotid bruit or JVD   Lungs:   Clear to auscultation bilaterally, respirations unlabored   Chest Wall:  No deformity or tenderness   Heart:  Regular rate and rhythm, S1, S2 normal, 2/6 systolic murmur, no rub or gallop   Abdomen:   Soft, non-tender, bowel sounds active all four quadrants,  no masses, no organomegaly   Extremities: Extremities normal, atraumatic, no cyanosis or edema   Pulses: 2+ and symmetric   Skin: Skin color, texture, turgor normal, no rashes or lesions   Pysch: Normal mood and affect   Neurologic: Normal gross motor and sensory exam.         Labs   CBC:   Lab Results   Component Value Date    WBC 12.1 2019    RBC 4.47 2019    HGB 13.9 2019    HCT 41.3 2019    MCV 92.4 2019    RDW 13.2 2019     2019     CMP:  Lab Results   Component Value Date     2019    K 4.6 2019     2019    CO2 18 2019    BUN 31 2019    CREATININE 1.1 2019    GFRAA >60 2019    GFRAA >60 2013    AGRATIO 1.5 2019    LABGLOM >60 2019    GLUCOSE 93 2019    PROT 7.3 2019    PROT 6.9 2013    CALCIUM 8.2 2019    BILITOT <0.2 2019    ALKPHOS 77 2019    AST 19 2019    ALT 19 2019     PT/INR:  No results found for: PTINR  HgBA1c:  Lab Results   Component Value Date    LABA1C 5.1 2017     Lab Results   Component Value Date    TROPONINI <0.01 2019         Cardiac Data     Last EK19  AFlutter HR 160s. Echo:    Stress Test:    Cath:    Studies:       I have reviewed labs and imaging/xray/diagnostic testing in this note.     Assessment        Patient Active Problem List   Diagnosis    TIA (transient ischemic attack)    Expressive aphasia    BPH (benign prostatic hyperplasia)    HTN (hypertension)    GERD (gastroesophageal reflux disease)  HLD (hyperlipidemia)    Tear of medial cartilage or meniscus of knee, current    Tear of lateral cartilage or meniscus of knee, current    SOB (shortness of breath)    Lightheadedness    Atrial flutter (HCC)           [id number] Score for Atrial Fibrillation Stroke Risk   Risk   Factors  Component Value   C CHF No 0   H HTN Yes 1   A2 Age >= 76 No,  (79 y.o.) 0   D DM No 0   S2 Prior Stroke/TIA Yes 2   V Vascular Disease No 0   A Age 74-69 Yes,  (79 y.o.) 1   Sc Sex male 0    [id number]  Score  4   Score last updated 0/63/26 21:20 AM    Click here for a link to the UpToDate guideline \"Atrial Fibrillation: Anticoagulation therapy to prevent embolization    Disclaimer: Risk Score calculation is dependent on accuracy of patient problem list and past encounter diagnosis. Plan   1. Echo to evaluate for shortness of breath and lightheadedness  2. Stress myoview to evaluate for shortness of breath   3. CT calcium score to evaluate for shortness of breath   4. 2 week cardiac monitor for aflutter  5. TSH, Liver and lipids  6. Discussed possible need for cardiac cath/angiogram if testing abnormal.   7. Stop fish oil and aspirin  8. Discussed Watchman procedure and ablation therapy  9. Start eliquis 5 mg twice daily  10. EP referral for aflutter          Scribe's attestation: This note was scribed in the presence of Dr. Ruiz Evans by Neptali Cornejo RN      Thank you for allowing us to participate in the care of Real Pia. Please call me with any questions [phone]. Ruiz Evans MD, [facility] [place]   Interventional Cardiologist  [facility] CONNOLLY [facility]  [phone] [address] [facility]  [phone] [address] [place] [address]  1/16/2020 11:57 AM    I will address the patient's cardiac risk factors and adjusted pharmacologic treatment as needed. In addition, I have reinforced the need for patient directed risk factor modification.   Tobacco use was discussed with the patient and educated on the negative effects and was

## 2020-01-16 ENCOUNTER — TELEPHONE (OUTPATIENT)
Dept: CARDIOLOGY CLINIC | Age: 75
End: 2020-01-16

## 2020-01-16 ENCOUNTER — OFFICE VISIT (OUTPATIENT)
Dept: CARDIOLOGY CLINIC | Age: 75
End: 2020-01-16
Payer: MEDICARE

## 2020-01-16 VITALS
DIASTOLIC BLOOD PRESSURE: 58 MMHG | HEIGHT: 72 IN | SYSTOLIC BLOOD PRESSURE: 108 MMHG | HEART RATE: 66 BPM | WEIGHT: 188 LBS | OXYGEN SATURATION: 99 % | BODY MASS INDEX: 25.47 KG/M2

## 2020-01-16 PROBLEM — I48.92 ATRIAL FLUTTER (HCC): Status: ACTIVE | Noted: 2020-01-16

## 2020-01-16 PROBLEM — R42 LIGHTHEADEDNESS: Status: ACTIVE | Noted: 2020-01-16

## 2020-01-16 PROCEDURE — 4040F PNEUMOC VAC/ADMIN/RCVD: CPT | Performed by: INTERNAL MEDICINE

## 2020-01-16 PROCEDURE — 0296T PR EXT ECG > 48HR TO 21 DAY RCRD W/CONECT INTL RCRD: CPT | Performed by: INTERNAL MEDICINE

## 2020-01-16 PROCEDURE — G8484 FLU IMMUNIZE NO ADMIN: HCPCS | Performed by: INTERNAL MEDICINE

## 2020-01-16 PROCEDURE — G8417 CALC BMI ABV UP PARAM F/U: HCPCS | Performed by: INTERNAL MEDICINE

## 2020-01-16 PROCEDURE — 3017F COLORECTAL CA SCREEN DOC REV: CPT | Performed by: INTERNAL MEDICINE

## 2020-01-16 PROCEDURE — G8427 DOCREV CUR MEDS BY ELIG CLIN: HCPCS | Performed by: INTERNAL MEDICINE

## 2020-01-16 PROCEDURE — 1036F TOBACCO NON-USER: CPT | Performed by: INTERNAL MEDICINE

## 2020-01-16 PROCEDURE — 1123F ACP DISCUSS/DSCN MKR DOCD: CPT | Performed by: INTERNAL MEDICINE

## 2020-01-16 PROCEDURE — 99204 OFFICE O/P NEW MOD 45 MIN: CPT | Performed by: INTERNAL MEDICINE

## 2020-01-16 NOTE — LETTER
[facility] [place]   CARDIAC EVALUATION NOTE  [phone]      PCP:  KEIRY Calabrese CNP    Reason for Consultation/Chief Complaint: SOB, lightheadedness     Subjective   History of Present Illness:  Yosi Gray is a 69 y.o. patient with a history of HTN, HLD, GERD and TIA who presents with SOB. His echo from 09/28/11 was 50-55% with moderate concentric LVH and grade I DD. Left atrial enlargement appears to be mild. Right ventricular enlargement appears to be mild. He presented to the ER on 12/31/19 with SOB and lightheadedness. Trop were negative x2. His EKG showed Aflutter . He reports he got up in the middle of the night and became lightheaded. He has worsening lightheadedness and became SOB. He returned to SR after vagal maneuvers. He has lightheadedness several times a day. He has ongoing fatigue since 12/31/19. He denies CP, recurrent SOB, BLE edema or syncope. 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 He is a former smoker. He stopped alcohol several years ago. Past Medical History:   has a past medical history of Arthritis, BPH (benign prostatic hyperplasia), Cataract, GERD (gastroesophageal reflux disease), HLD (hyperlipidemia), HTN (hypertension), Nephrolithiasis, Pruritus, Tear of meniscus of knee, and TIA (transient ischemic attack). Surgical History:   has a past surgical history that includes Cystoscopy; Appendectomy; Tonsillectomy; skin biopsy; Plantar fascia surgery; Knee arthroscopy; Shoulder arthroscopy; Mandible surgery; TURP; hernia repair (Right); Knee arthroscopy (1/7/14); Colonoscopy (before 2005); Cataract removal with implant (Left, 04/19/2018); and Cataract removal with implant (Right, 05/17/2018). Social History:   reports that he quit smoking about 23 years ago. He has a 60.00 pack-year smoking history. He has never used smokeless tobacco. He reports that he does not drink alcohol or use drugs.      Family History: family history includes Diabetes in his brother; Heart Disease in his brother; Heart Disease (age of onset: 61) in his father; High Blood Pressure in his father; Stroke in his brother and father. Father with CAD. Home Medications:  Were reviewed and are listed in nursing record and/or below  Prior to Admission medications    Medication Sig Start Date End Date Taking? Authorizing Provider   Probiotic Product (PROBIOTIC DAILY PO) Take by mouth   Yes Historical Provider, MD   lisinopril (PRINIVIL;ZESTRIL) 40 MG tablet TAKE ONE TABLET BY MOUTH DAILY 9/11/19  Yes KEIRY Delgado CNP   COCONUT OIL PO Take by mouth   Yes Historical Provider, MD   GARLIC PO Take 1 tablet by mouth 2 times daily   Yes Historical Provider, MD   TURMERIC PO Take 1 tablet by mouth daily   Yes Historical Provider, MD   B Complex Vitamins (VITAMIN-B COMPLEX) TABS Take 1 tablet by mouth daily   Yes Historical Provider, MD   Coenzyme Q10 (COQ10 PO) Take 1 tablet by mouth 2 times daily   Yes Historical Provider, MD   ALPHA LIPOIC ACID PO Take 1 tablet by mouth 2 times daily   Yes Historical Provider, MD   ibuprofen (ADVIL;MOTRIN) 600 MG tablet Take 1 tablet by mouth every 6 hours as needed for Pain. 1/7/14  Yes SIRIA Crystal   Vitamin D (CHOLECALCIFEROL) 1000 UNITS CAPS capsule Take 1,000 Units by mouth daily. Yes Historical Provider, MD          Allergies:  Patient has no known allergies. Review of Systems:   A 14 point review of symptoms completed. Pertinent positives identified in the HPI, all other review of symptoms negative as below.       Objective   PHYSICAL EXAM:    Vitals:    01/16/20 1143   BP: (!) 108/58   Pulse:    SpO2:     Weight: 188 lb (85.3 kg)         General Appearance:  Alert, cooperative, no distress, appears stated age   Head:  Normocephalic, without obvious abnormality, atraumatic   Eyes:  PERRL, conjunctiva/corneas clear   Nose: Nares normal, no drainage or sinus tenderness  HTN (hypertension)    GERD (gastroesophageal reflux disease)    HLD (hyperlipidemia)    Tear of medial cartilage or meniscus of knee, current    Tear of lateral cartilage or meniscus of knee, current    SOB (shortness of breath)    Lightheadedness    Atrial flutter (HCC)           [id number] Score for Atrial Fibrillation Stroke Risk   Risk   Factors  Component Value   C CHF No 0   H HTN Yes 1   A2 Age >= 75 No,  (79 y.o.) 0   D DM No 0   S2 Prior Stroke/TIA Yes 2   V Vascular Disease No 0   A Age 74-69 Yes,  (79 y.o.) 1   Sc Sex male 0    [id number]  Score  4   Score last updated 0/55/73 15:83 AM    Click here for a link to the UpToDate guideline \"Atrial Fibrillation: Anticoagulation therapy to prevent embolization    Disclaimer: Risk Score calculation is dependent on accuracy of patient problem list and past encounter diagnosis. Plan   1. Echo to evaluate for shortness of breath and lightheadedness  2. Stress myoview to evaluate for shortness of breath   3. CT calcium score to evaluate for shortness of breath   4. 2 week cardiac monitor for aflutter  5. TSH, Liver and lipids  6. Discussed possible need for cardiac cath/angiogram if testing abnormal.   7. Stop fish oil and aspirin  8. Discussed Watchman procedure and ablation therapy  9. Start eliquis 5 mg twice daily  10. EP referral for aflutter          Scribe's attestation: This note was scribed in the presence of Dr. Thor Boothe by Trisha Fleming RN      Thank you for allowing us to participate in the care of Blessing Naidu. Please call me with any questions [phone]. Thor Boothe MD, [facility]   Interventional Cardiologist  [address]  [phone] [facility]  [phone] [address] [place] [address]  1/16/2020 11:57 AM    I will address the patient's cardiac risk factors and adjusted pharmacologic treatment as needed. In addition, I have reinforced the need for patient directed risk factor modification. Tobacco use was discussed with the patient and educated on the negative effects and was asked not to use. All questions and concerns were addressed to the patient/family. Alternatives to my treatment were discussed. I, Dr Myke Harris, personally performed the services described in this documentation, as scribed by the above signed scribe in my presence. It is both accurate and complete to my knowledge. I agree with the details independently gathered by the clinical support staff and the scribed note accurately describes my personal service to the patient.

## 2020-01-16 NOTE — TELEPHONE ENCOUNTER
Monitor placed by [address] [place] [address]  Length of monitor 14 DAY  Monitor ordered by [employer]  Serial number [id number]  Kit ID [id number]  Activation successful prior to pt leaving office?  Yes

## 2020-01-30 ENCOUNTER — HOSPITAL ENCOUNTER (OUTPATIENT)
Dept: NUCLEAR MEDICINE | Age: 75
Discharge: HOME | End: 2020-01-30
Payer: MEDICARE

## 2020-01-30 ENCOUNTER — HOSPITAL ENCOUNTER (OUTPATIENT)
Age: 75
Discharge: HOME | End: 2020-01-30
Payer: MEDICARE

## 2020-01-30 ENCOUNTER — HOSPITAL ENCOUNTER (OUTPATIENT)
Dept: NON INVASIVE DIAGNOSTICS | Age: 75
Discharge: HOME | End: 2020-01-30
Payer: MEDICARE

## 2020-01-30 ENCOUNTER — HOSPITAL ENCOUNTER (OUTPATIENT)
Dept: CT IMAGING | Age: 75
Discharge: HOME | End: 2020-01-30
Payer: MEDICARE

## 2020-01-30 LAB
ALBUMIN SERPL-MCNC: 4.4 G/DL (ref 3.4–5)
ALP BLD-CCNC: 89 U/L (ref 40–129)
ALT SERPL-CCNC: 22 U/L (ref 10–40)
AST SERPL-CCNC: 21 U/L (ref 15–37)
BILIRUB SERPL-MCNC: 0.4 MG/DL (ref 0–1)
BILIRUBIN DIRECT: <0.2 MG/DL (ref 0–0.3)
BILIRUBIN, INDIRECT: NORMAL MG/DL (ref 0–1)
CHOLESTEROL, TOTAL: 191 MG/DL (ref 0–199)
HDLC SERPL-MCNC: 69 MG/DL (ref 40–60)
LDL CHOLESTEROL CALCULATED: 107 MG/DL
LV EF: 55 %
LV EF: 60 %
LVEF MODALITY: NORMAL
LVEF MODALITY: NORMAL
TOTAL PROTEIN: 7.2 G/DL (ref 6.4–8.2)
TRIGL SERPL-MCNC: 76 MG/DL (ref 0–150)
TSH REFLEX: 1.36 UIU/ML (ref 0.27–4.2)
VLDLC SERPL CALC-MCNC: 15 MG/DL

## 2020-01-30 PROCEDURE — 80061 LIPID PANEL: CPT

## 2020-01-30 PROCEDURE — 78452 HT MUSCLE IMAGE SPECT MULT: CPT

## 2020-01-30 PROCEDURE — 84443 ASSAY THYROID STIM HORMONE: CPT

## 2020-01-30 PROCEDURE — 3430000000 HC RX DIAGNOSTIC RADIOPHARMACEUTICAL: Performed by: INTERNAL MEDICINE

## 2020-01-30 PROCEDURE — 93306 TTE W/DOPPLER COMPLETE: CPT

## 2020-01-30 PROCEDURE — 36415 COLL VENOUS BLD VENIPUNCTURE: CPT

## 2020-01-30 PROCEDURE — 6360000002 HC RX W HCPCS: Performed by: INTERNAL MEDICINE

## 2020-01-30 PROCEDURE — A9502 TC99M TETROFOSMIN: HCPCS | Performed by: INTERNAL MEDICINE

## 2020-01-30 PROCEDURE — 75571 CT HRT W/O DYE W/CA TEST: CPT

## 2020-01-30 PROCEDURE — C8929 TTE W OR WO FOL WCON,DOPPLER: HCPCS

## 2020-01-30 PROCEDURE — 80076 HEPATIC FUNCTION PANEL: CPT

## 2020-01-30 PROCEDURE — 93017 CV STRESS TEST TRACING ONLY: CPT

## 2020-01-30 RX ADMIN — TETROFOSMIN 11.1 MILLICURIE: 1.38 INJECTION, POWDER, LYOPHILIZED, FOR SOLUTION INTRAVENOUS at 08:30

## 2020-01-30 RX ADMIN — TETROFOSMIN 32.8 MILLICURIE: 1.38 INJECTION, POWDER, LYOPHILIZED, FOR SOLUTION INTRAVENOUS at 09:56

## 2020-01-30 RX ADMIN — REGADENOSON 0.4 MG: 0.08 INJECTION, SOLUTION INTRAVENOUS at 09:56

## 2020-01-31 ENCOUNTER — TELEPHONE (OUTPATIENT)
Dept: CARDIOLOGY CLINIC | Age: 75
End: 2020-01-31

## 2020-01-31 NOTE — TELEPHONE ENCOUNTER
----- Message from Nathan Bell MD sent at 1/30/2020 12:05 PM EST -----  Let patient know their CT calcium test shows moderate calcific coronary plaque for which we will complete the remaining test to determine if there needs to be a change in any treatment. In interim, I would recommend going ahead with a statin and starting Lipitor 10 mg p.o. nightly and getting a follow-up lipids and LFTs in 1 month. Also, let him know that there are incidental findings within the lung including possible scarring and lymph nodes and we will need to refer him to pulmonary for evaluation for that. Thanks.

## 2020-01-31 NOTE — TELEPHONE ENCOUNTER
----- Message from Benigno Champion MD sent at 1/30/2020 12:51 PM EST -----  Let patient know echo test shows normal heart function, no new orders or changes at this time. Thanks.

## 2020-01-31 NOTE — TELEPHONE ENCOUNTER
----- Message from Mary Larkin MD sent at 1/30/2020  4:25 PM EST -----  Let patient know their lipid test is stable, continue current meds, no new orders or changes at this time. Thanks.

## 2020-02-04 NOTE — PROGRESS NOTES
allergies. Social History:   reports that he quit smoking about 23 years ago. He has a 60.00 pack-year smoking history. He has never used smokeless tobacco. He reports that he does not drink alcohol or use drugs. Family History: family history includes Diabetes in his brother; Heart Disease in his brother; Heart Disease (age of onset: 61) in his father; High Blood Pressure in his father; Stroke in his brother and father. Home Medications:    Prior to Admission medications    Medication Sig Start Date End Date Taking? Authorizing Provider   Omega-3 Fatty Acids (FISH OIL PO) Take by mouth   Yes Historical Provider, MD   Probiotic Product (PROBIOTIC DAILY PO) Take by mouth   Yes Historical Provider, MD   lisinopril (PRINIVIL;ZESTRIL) 40 MG tablet TAKE ONE TABLET BY MOUTH DAILY 9/11/19  Yes KEIRY Leyva CNP   COCONUT OIL PO Take by mouth   Yes Historical Provider, MD   GARLIC PO Take 1 tablet by mouth 2 times daily   Yes Historical Provider, MD   TURMERIC PO Take 1 tablet by mouth daily   Yes Historical Provider, MD   B Complex Vitamins (VITAMIN-B COMPLEX) TABS Take 1 tablet by mouth daily   Yes Historical Provider, MD   Coenzyme Q10 (COQ10 PO) Take 1 tablet by mouth 2 times daily   Yes Historical Provider, MD   ALPHA LIPOIC ACID PO Take 1 tablet by mouth 2 times daily   Yes Historical Provider, MD   ibuprofen (ADVIL;MOTRIN) 600 MG tablet Take 1 tablet by mouth every 6 hours as needed for Pain. 1/7/14  Yes SIRIA Hagan   Vitamin D (CHOLECALCIFEROL) 1000 UNITS CAPS capsule Take 1,000 Units by mouth daily. Yes Historical Provider, MD   apixaban (ELIQUIS) 5 MG TABS tablet Take 1 tablet by mouth 2 times daily  Patient not taking: Reported on 2/6/2020 1/17/20 2/16/20  Zachariah Noland MD     REVIEW OF SYSTEMS:    All 14-point review of systems are completed and  pertinent positives are mentioned in the history of present illness. Other  systems are reviewed and are negative.     Physical decision making performed by the nurse and myself.      Electronically signed by Jo-Ann Sykes MD on 2/6/2020 at 7:53 PM

## 2020-02-06 ENCOUNTER — OFFICE VISIT (OUTPATIENT)
Dept: CARDIOLOGY CLINIC | Age: 75
End: 2020-02-06
Payer: MEDICARE

## 2020-02-06 VITALS
OXYGEN SATURATION: 97 % | HEART RATE: 71 BPM | HEIGHT: 72 IN | BODY MASS INDEX: 26.03 KG/M2 | WEIGHT: 192.2 LBS | SYSTOLIC BLOOD PRESSURE: 116 MMHG | DIASTOLIC BLOOD PRESSURE: 72 MMHG

## 2020-02-06 PROCEDURE — 93000 ELECTROCARDIOGRAM COMPLETE: CPT | Performed by: INTERNAL MEDICINE

## 2020-02-06 PROCEDURE — G8417 CALC BMI ABV UP PARAM F/U: HCPCS | Performed by: INTERNAL MEDICINE

## 2020-02-06 PROCEDURE — G8427 DOCREV CUR MEDS BY ELIG CLIN: HCPCS | Performed by: INTERNAL MEDICINE

## 2020-02-06 PROCEDURE — 99204 OFFICE O/P NEW MOD 45 MIN: CPT | Performed by: INTERNAL MEDICINE

## 2020-02-06 PROCEDURE — G8484 FLU IMMUNIZE NO ADMIN: HCPCS | Performed by: INTERNAL MEDICINE

## 2020-02-06 NOTE — LETTER
[facility]   Electrophysiology Consult Note              Date:  February 6, 2020  Patient name: Yaima Dickinson  YOB: 1945    Reason for Consult:  New Patient for atrial flutter     Requesting Physician:KEIRY Evans CNP    HISTORY OF PRESENT ILLNESS: Yaima Dickinson is a 69 y.o. male who presents for evaluation for atrial flutter. He has a past medical history of hypertension, hyperlipidemia, coronary artery disease, TIA, and GERD. He presented to the ER on 12/31/19 with shortness of breath and light headedness. Troponin's were negative x2. His EKG at the time showed atrial flutter. He had a CT calcium score that showed his plaque burden is 368. An echocardiogram from 1/3/2020 demonstrated her LVEF is 55%. Today he states he has not picked up his anticoagulation therapy due to the cost. He currently does not have insurance drug coverage and his medication was going to cost $400.00 for a 30 day supply. He wore a 2 week CAM monitor that he brought with him to the office today. He states he did have some feeling of being light headed and shortness of breath while wearing his monitor. Patient currently denies any weight gain, edema, chest pain, and syncope. Past Medical History:   has a past medical history of Arthritis, BPH (benign prostatic hyperplasia), Cataract, GERD (gastroesophageal reflux disease), HLD (hyperlipidemia), HTN (hypertension), Nephrolithiasis, Pruritus, Tear of meniscus of knee, and TIA (transient ischemic attack). Past Surgical History:   has a past surgical history that includes Cystoscopy; Appendectomy; Tonsillectomy; skin biopsy; Plantar fascia surgery; Knee arthroscopy; Shoulder arthroscopy; Mandible surgery; TURP; hernia repair (Right); Knee arthroscopy (1/7/14); Colonoscopy (before 2005); Cataract removal with implant (Left, 04/19/2018); and Cataract removal with implant (Right, 05/17/2018). accurately reflects the work, physical examination, discussion of treatments and procedures, and medical decision making performed by the nurse and myself.      Electronically signed by Sayda Barr MD on 2/6/2020 at 7:53 PM

## 2020-02-10 ENCOUNTER — TELEPHONE (OUTPATIENT)
Dept: CARDIOLOGY CLINIC | Age: 75
End: 2020-02-10

## 2020-02-10 NOTE — TELEPHONE ENCOUNTER
Patient seen with Dr. Tricia Patel last week and was started on Xarelto. Samples were provided. Is there patient assistance that can be started for him?

## 2020-02-13 ENCOUNTER — TELEPHONE (OUTPATIENT)
Dept: CARDIOLOGY CLINIC | Age: 75
End: 2020-02-13

## 2020-02-14 PROCEDURE — 0298T PR EXT ECG > 48HR TO 21 DAY REVIEW AND INTERPRETATN: CPT | Performed by: INTERNAL MEDICINE

## 2020-02-14 NOTE — TELEPHONE ENCOUNTER
Spoke to pt. He said that he will drop off the FMLA ppw, and while he is here he will sign the pt assistance ppw for his xarelto as well. As soon as I get [facility] I will complete it get AGK to sign and fax in.  Pt V/U.

## 2020-02-14 NOTE — TELEPHONE ENCOUNTER
I'm happy to sign paperwork. I called him but no answer. I left a VM. He sounds very symptomatic. Thanks.

## 2020-02-27 ENCOUNTER — TELEPHONE (OUTPATIENT)
Dept: CARDIOLOGY CLINIC | Age: 75
End: 2020-02-27

## 2020-03-04 NOTE — PROGRESS NOTES
attack). Past Surgical History:   has a past surgical history that includes Cystoscopy; Appendectomy; Tonsillectomy; skin biopsy; Plantar fascia surgery; Knee arthroscopy; Shoulder arthroscopy; Mandible surgery; TURP; hernia repair (Right); Knee arthroscopy (1/7/14); Colonoscopy (before 2005); Cataract removal with implant (Left, 04/19/2018); and Cataract removal with implant (Right, 05/17/2018). Allergies:  Patient has no known allergies. Social History:   reports that he quit smoking about 23 years ago. He has a 60.00 pack-year smoking history. He has never used smokeless tobacco. He reports that he does not drink alcohol or use drugs. Family History: family history includes Diabetes in his brother; Heart Disease in his brother; Heart Disease (age of onset: 61) in his father; High Blood Pressure in his father; Stroke in his brother and father. Home Medications:    Prior to Admission medications    Medication Sig Start Date End Date Taking? Authorizing Provider   rivaroxaban (XARELTO) 20 MG TABS tablet Take 20 mg by mouth daily (with breakfast)   Yes Historical Provider, MD   Probiotic Product (PROBIOTIC DAILY PO) Take by mouth   Yes Historical Provider, MD   lisinopril (PRINIVIL;ZESTRIL) 40 MG tablet TAKE ONE TABLET BY MOUTH DAILY 9/11/19  Yes KEIRY Cheney CNP   COCONUT OIL PO Take by mouth   Yes Historical Provider, MD   GARLIC PO Take 1 tablet by mouth 2 times daily   Yes Historical Provider, MD   TURMERIC PO Take 1 tablet by mouth daily   Yes Historical Provider, MD   B Complex Vitamins (VITAMIN-B COMPLEX) TABS Take 1 tablet by mouth daily   Yes Historical Provider, MD   Coenzyme Q10 (COQ10 PO) Take 1 tablet by mouth 2 times daily   Yes Historical Provider, MD   ALPHA LIPOIC ACID PO Take 1 tablet by mouth 2 times daily   Yes Historical Provider, MD   ibuprofen (ADVIL;MOTRIN) 600 MG tablet Take 1 tablet by mouth every 6 hours as needed for Pain.  1/7/14  Yes SIRIA Krishnan Agatston calcium score of 368       IMPRESSION:    1. Atrial flutter   02/06/2020  Mr. Cris Ford is a pleasant 69year old female with a medical history significant for atrial flutter, hypertension, coronary artery disease, and cerebral vascular disease who presents to [facility] care. Patient's EKG show only atrial flutter without atrial fibrillation. His [id number] score is 5. We discussed rate control, rhythm control, AVN + pacemaker, and atrial fibrillation/flutter ablation. We reviewed risks and benefits of each approach. We discussed side effects of class IC, class II, class III, and class IV antiarrhythmics. All questions were answered. We do not have his monitor as he just turned in device. He will call back in 1 week to discuss monitor results if he hasn't heard back from me. If monitor only shows atrial flutter we discussed ablation. We discussed that atrial flutter is a cousin of atrial fibrillation and is usually initiated by PACs or atrial fibrillation and that he may develop it in future. We discussed long term monitor for this if we go after his flutter. We discussed anticoagulation if we perform only flutter ablation. He understands and will follow up. Due to cost and availability we will provide him with rivarxoaban for anticoagulation. -[id number] Score 4 (hypertension, CAD,TIA, age)              -Eliquis 5 mg BID- he is not able to afford this medication. We will provide samples of anticoagulation. 03/05/2020  Mr. Douglass presents from home for follow-up having worn a monitor. His monitor showed PSVT likely atrial tachycardia without recurrent atrial flutter or atrial fibrillation. We discussed potentially doing an ablation however since patient is having symptoms without atrial fibrillation/flutter I am not sure how much an ablation would really help him at this point.   If his atrial fibrillation/flutter returns I be happy to perform an atrial fibrillation ablation scotty's documentation has been prepared under my direction and personally reviewed by me in its entirety. I confirm that the note above accurately reflects all work, physical examination, the discussion of treatments and procedures, and medical decision making performed by me. Salvador Rayo MD personally performed the services described in this documentation as scribed by nurse in my presence, and is both accurate and complete.     Electronically signed by Danica Dickson MD on 3/6/2020 at 1:31 PM

## 2020-03-05 ENCOUNTER — TELEPHONE (OUTPATIENT)
Dept: CARDIOLOGY CLINIC | Age: 75
End: 2020-03-05

## 2020-03-05 ENCOUNTER — OFFICE VISIT (OUTPATIENT)
Dept: CARDIOLOGY CLINIC | Age: 75
End: 2020-03-05
Payer: MEDICARE

## 2020-03-05 VITALS
OXYGEN SATURATION: 97 % | SYSTOLIC BLOOD PRESSURE: 122 MMHG | DIASTOLIC BLOOD PRESSURE: 60 MMHG | BODY MASS INDEX: 26.14 KG/M2 | WEIGHT: 193 LBS | HEIGHT: 72 IN | HEART RATE: 68 BPM

## 2020-03-05 PROBLEM — I47.19 ATRIAL TACHYCARDIA: Status: ACTIVE | Noted: 2020-03-05

## 2020-03-05 PROBLEM — I47.1 ATRIAL TACHYCARDIA (HCC): Status: ACTIVE | Noted: 2020-03-05

## 2020-03-05 PROCEDURE — 1123F ACP DISCUSS/DSCN MKR DOCD: CPT | Performed by: INTERNAL MEDICINE

## 2020-03-05 PROCEDURE — G8427 DOCREV CUR MEDS BY ELIG CLIN: HCPCS | Performed by: INTERNAL MEDICINE

## 2020-03-05 PROCEDURE — 93000 ELECTROCARDIOGRAM COMPLETE: CPT | Performed by: INTERNAL MEDICINE

## 2020-03-05 PROCEDURE — G8417 CALC BMI ABV UP PARAM F/U: HCPCS | Performed by: INTERNAL MEDICINE

## 2020-03-05 PROCEDURE — 1036F TOBACCO NON-USER: CPT | Performed by: INTERNAL MEDICINE

## 2020-03-05 PROCEDURE — 3017F COLORECTAL CA SCREEN DOC REV: CPT | Performed by: INTERNAL MEDICINE

## 2020-03-05 PROCEDURE — 4040F PNEUMOC VAC/ADMIN/RCVD: CPT | Performed by: INTERNAL MEDICINE

## 2020-03-05 PROCEDURE — G8484 FLU IMMUNIZE NO ADMIN: HCPCS | Performed by: INTERNAL MEDICINE

## 2020-03-05 PROCEDURE — 99215 OFFICE O/P EST HI 40 MIN: CPT | Performed by: INTERNAL MEDICINE

## 2020-03-05 NOTE — PATIENT INSTRUCTIONS
RECOMMENDATIONS:  1. Shortness of breath see a pulmonologist.  Dr. Bijan Albarado  2. Start Toprol XL 25mg nightly  3. If the shortness of breath worsens, then reassess with another monitor or place a loop recorder. Takes about 15 minutes to place. You can drive after. Nothing to eat or drink 4 hours prior. 4.  Ok to return to work  5. Follow up 1 month after loop recorder placed.

## 2020-03-05 NOTE — TELEPHONE ENCOUNTER
Patient was seen in office today. Patient is scheduled with Dr. Nancy Castillo for Loop Implant on 3/12/20 at [facility], arrival time of 11:30am to the Cath Lab. Please have patient arrive to the main entrance of [facility] at 11:15am and check in with the registration desk. Medications reviewed in office with WALLACE Doherty. Remind patient to be NPO after midnight (8 hours prior). Do not apply lotions/creams on skin the day of procedure.

## 2020-03-06 RX ORDER — METOPROLOL SUCCINATE 25 MG/1
25 TABLET, EXTENDED RELEASE ORAL NIGHTLY
Qty: 30 TABLET | Refills: 11 | Status: SHIPPED | OUTPATIENT
Start: 2020-03-06 | End: 2020-12-31

## 2020-03-12 ENCOUNTER — TELEPHONE (OUTPATIENT)
Dept: CARDIOLOGY CLINIC | Age: 75
End: 2020-03-12

## 2020-03-12 ENCOUNTER — HOSPITAL ENCOUNTER (OUTPATIENT)
Dept: CARDIAC CATH/INVASIVE PROCEDURES | Age: 75
Discharge: HOME | End: 2020-03-12
Attending: INTERNAL MEDICINE | Admitting: INTERNAL MEDICINE
Payer: MEDICARE

## 2020-03-12 VITALS — BODY MASS INDEX: 26.14 KG/M2 | HEIGHT: 72 IN | WEIGHT: 193 LBS

## 2020-03-12 LAB
ANION GAP SERPL CALCULATED.3IONS-SCNC: 13 MMOL/L (ref 3–16)
BUN BLDV-MCNC: 16 MG/DL (ref 7–20)
CALCIUM SERPL-MCNC: 9.8 MG/DL (ref 8.3–10.6)
CHLORIDE BLD-SCNC: 100 MMOL/L (ref 99–110)
CO2: 25 MMOL/L (ref 21–32)
CREAT SERPL-MCNC: 0.8 MG/DL (ref 0.8–1.3)
GFR AFRICAN AMERICAN: >60
GFR NON-AFRICAN AMERICAN: >60
GLUCOSE BLD-MCNC: 109 MG/DL (ref 70–99)
HCT VFR BLD CALC: 42.4 % (ref 40.5–52.5)
HEMOGLOBIN: 14.4 G/DL (ref 13.5–17.5)
INR BLD: 1.11 (ref 0.86–1.14)
MCH RBC QN AUTO: 31.4 PG (ref 26–34)
MCHC RBC AUTO-ENTMCNC: 33.9 G/DL (ref 31–36)
MCV RBC AUTO: 92.6 FL (ref 80–100)
PDW BLD-RTO: 13.5 % (ref 12.4–15.4)
PLATELET # BLD: 253 K/UL (ref 135–450)
PMV BLD AUTO: 7.9 FL (ref 5–10.5)
POTASSIUM SERPL-SCNC: 4.8 MMOL/L (ref 3.5–5.1)
PROTHROMBIN TIME: 12.9 SEC (ref 10–13.2)
RBC # BLD: 4.58 M/UL (ref 4.2–5.9)
SODIUM BLD-SCNC: 138 MMOL/L (ref 136–145)
WBC # BLD: 11.9 K/UL (ref 4–11)

## 2020-03-12 PROCEDURE — 33285 INSJ SUBQ CAR RHYTHM MNTR: CPT | Performed by: INTERNAL MEDICINE

## 2020-03-12 PROCEDURE — 2500000003 HC RX 250 WO HCPCS

## 2020-03-12 PROCEDURE — 80048 BASIC METABOLIC PNL TOTAL CA: CPT

## 2020-03-12 PROCEDURE — 85610 PROTHROMBIN TIME: CPT

## 2020-03-12 PROCEDURE — 33285 INSJ SUBQ CAR RHYTHM MNTR: CPT

## 2020-03-12 PROCEDURE — C1764 EVENT RECORDER, CARDIAC: HCPCS

## 2020-03-12 PROCEDURE — 85027 COMPLETE CBC AUTOMATED: CPT

## 2020-03-12 PROCEDURE — 2580000003 HC RX 258

## 2020-03-12 RX ORDER — SODIUM CHLORIDE 0.9 % (FLUSH) 0.9 %
10 SYRINGE (ML) INJECTION EVERY 12 HOURS SCHEDULED
Status: DISCONTINUED | OUTPATIENT
Start: 2020-03-12 | End: 2020-03-12 | Stop reason: HOSPADM

## 2020-03-12 RX ORDER — SODIUM CHLORIDE 0.9 % (FLUSH) 0.9 %
10 SYRINGE (ML) INJECTION PRN
Status: DISCONTINUED | OUTPATIENT
Start: 2020-03-12 | End: 2020-03-12 | Stop reason: HOSPADM

## 2020-03-12 RX ORDER — SODIUM CHLORIDE 9 MG/ML
1000 INJECTION, SOLUTION INTRAVENOUS CONTINUOUS
Status: DISCONTINUED | OUTPATIENT
Start: 2020-03-12 | End: 2020-03-12 | Stop reason: HOSPADM

## 2020-03-12 NOTE — H&P
H&P Update    I have reviewed the history and physical from Dr. Seda Mathur on 2020 and examined the patient and find no relevant changes. I have reviewed with the patient and/or family the risks, benefits, and alternatives to the procedure. Pre-sedation Assessment    Patient:  Yulia Perez   :   1945  Intended Procedure: Loop monitor implantation. [facility] nurses notes reviewed and agreed. Medications reviewed  Allergies: No Known Allergies      Pre-Procedure Assessment/Plan:  ASA 2 - Patient with mild systemic disease with no functional limitations    Level of Sedation Plan:No sedation    Post Procedure plan: Discharge home.

## 2020-03-14 PROBLEM — Z45.09 ENCOUNTER FOR LOOP RECORDER CHECK: Status: ACTIVE | Noted: 2020-03-14

## 2020-03-16 ENCOUNTER — TELEPHONE (OUTPATIENT)
Dept: CARDIOLOGY CLINIC | Age: 75
End: 2020-03-16

## 2020-03-30 ENCOUNTER — TELEPHONE (OUTPATIENT)
Dept: PULMONOLOGY | Age: 75
End: 2020-03-30

## 2020-04-15 ENCOUNTER — NURSE ONLY (OUTPATIENT)
Dept: CARDIOLOGY CLINIC | Age: 75
End: 2020-04-15
Payer: MEDICARE

## 2020-04-15 PROCEDURE — G2066 INTER DEVC REMOTE 30D: HCPCS | Performed by: INTERNAL MEDICINE

## 2020-04-15 PROCEDURE — 93298 REM INTERROG DEV EVAL SCRMS: CPT | Performed by: INTERNAL MEDICINE

## 2020-04-15 NOTE — PROGRESS NOTES
Remote interrogation of implanted cardiac event monitor shows normal function. Patient has a history of A Flutter and AT. Patient takes Xarelto and Toprol XL. Patient has not been seen since implant due to pandemic. Since 3/12 (implant) 1 pause recorded which is undersensed. Patient has an office visit with Dr. Fisher Labor 4/16. Follow up 1 month via Carelink. See Paceart report under the Cardiology tab.

## 2020-05-18 ENCOUNTER — NURSE ONLY (OUTPATIENT)
Dept: CARDIOLOGY CLINIC | Age: 75
End: 2020-05-18
Payer: MEDICARE

## 2020-05-18 PROCEDURE — 93298 REM INTERROG DEV EVAL SCRMS: CPT | Performed by: INTERNAL MEDICINE

## 2020-05-18 PROCEDURE — G2066 INTER DEVC REMOTE 30D: HCPCS | Performed by: INTERNAL MEDICINE

## 2020-06-18 ENCOUNTER — NURSE ONLY (OUTPATIENT)
Dept: CARDIOLOGY CLINIC | Age: 75
End: 2020-06-18
Payer: MEDICARE

## 2020-06-18 PROCEDURE — G2066 INTER DEVC REMOTE 30D: HCPCS | Performed by: INTERNAL MEDICINE

## 2020-06-18 PROCEDURE — 93298 REM INTERROG DEV EVAL SCRMS: CPT | Performed by: INTERNAL MEDICINE

## 2020-07-02 RX ORDER — LISINOPRIL 40 MG/1
TABLET ORAL
Qty: 90 TABLET | Refills: 0 | Status: SHIPPED | OUTPATIENT
Start: 2020-07-02 | End: 2020-12-09 | Stop reason: SDUPTHER

## 2020-07-23 ENCOUNTER — NURSE ONLY (OUTPATIENT)
Dept: CARDIOLOGY CLINIC | Age: 75
End: 2020-07-23
Payer: MEDICARE

## 2020-07-23 PROCEDURE — 93298 REM INTERROG DEV EVAL SCRMS: CPT | Performed by: INTERNAL MEDICINE

## 2020-07-23 PROCEDURE — G2066 INTER DEVC REMOTE 30D: HCPCS | Performed by: INTERNAL MEDICINE

## 2020-07-23 NOTE — PROGRESS NOTES
Remote interrogation of implanted cardiac event monitor shows normal function. Patient has a history of AT, Atrial flutter, and TIA  Takes Toprol XL and Xarelto. Patient's last device interrogation was on 6/18. Since last interrogation, 1 tachy event recorded on 7/13 x 8 seconds appears to show SR w/ectopy and PAT. See Paceart report under the Cardiology tab. Follow up 1 month via [email or web address].

## 2020-07-23 NOTE — LETTER
[address] [facility] [phone]  [address]  [address] [facility] [phone]    Pacemaker/Defibrillator Clinic          07/23/20        [address] [place] [address]        Dear [address] [place] [address]    This letter is to inform you that we received the transmission from your monitor at home that checks your implanted heart device. The next date your monitor will automatically transmit will be 8/27/20. If your report needs attention we will notify you. Your device and monitor are wireless and most transmit cellularly, but please periodically check your monitor is still plugged in to the electrical outlet. If you still use the telephone land line to send please ensure the connection to the phone nel is secure. This will help to ensure successful automatic transmissions in the future. Also, the monitor needs to be close to you while sleeping at night. Please be aware that the remote device transmission sites are periodically monitored only during regular business hours during which simultaneous in-office device clinics are being run. If your transmission requires attention, we will contact you as soon as possible. Thank you.             [facility]

## 2020-12-09 ENCOUNTER — NURSE ONLY (OUTPATIENT)
Dept: CARDIOLOGY CLINIC | Age: 75
End: 2020-12-09
Payer: MEDICARE

## 2020-12-09 PROCEDURE — G2066 INTER DEVC REMOTE 30D: HCPCS | Performed by: INTERNAL MEDICINE

## 2020-12-09 PROCEDURE — 93298 REM INTERROG DEV EVAL SCRMS: CPT | Performed by: INTERNAL MEDICINE

## 2020-12-09 RX ORDER — LISINOPRIL 40 MG/1
TABLET ORAL
Qty: 90 TABLET | Refills: 0 | Status: SHIPPED | OUTPATIENT
Start: 2020-12-09 | End: 2020-12-31 | Stop reason: SDUPTHER

## 2020-12-09 NOTE — TELEPHONE ENCOUNTER
LOV 9.11.2019    Future visit 12.31.2020    The patient would like a refill for   lisinopril (PRINIVIL;ZESTRIL) 40 MG tablet. The patient is requesting a 90 day supply.      [facility]

## 2020-12-09 NOTE — LETTER
[address] [phone]  [address]   [address] [address] [phone]    Pacemaker/Defibrillator Clinic          12/11/20        [address] [place] [address]        Dear [address] [place] [address]    This letter is to inform you that we received the transmission from your monitor at home that checks your implanted heart device. The next date your monitor will automatically transmit will be 1/13/2021. No AFib recorded to date. If your report needs attention we will notify you. Your device and monitor are wireless and most transmit cellularly, but please periodically check your monitor is still plugged in to the electrical outlet. If you still use the telephone land line to send please ensure the connection to the phone nel is secure. This will help to ensure successful automatic transmissions in the future. Also, the monitor needs to be close to you while sleeping at night. Please be aware that the remote device transmission sites are periodically monitored only during regular business hours during which simultaneous in-office device clinics are being run. If your transmission requires attention, we will contact you as soon as possible. Thank you.             [facility] [address]

## 2020-12-11 NOTE — PROGRESS NOTES
Remote interrogation of implanted cardiac event monitor shows normal function. ILR for TIA. Monitor for AF (oac).   Hx AT/AFL/SVT. (psvt 8/4/20)   No AF recorded to date. No new arrhythmias. Pause is undersensed. Follow up 1 month via carelink.

## 2020-12-21 ENCOUNTER — NURSE ONLY (OUTPATIENT)
Dept: CARDIOLOGY CLINIC | Age: 75
End: 2020-12-21

## 2020-12-21 NOTE — PROGRESS NOTES
ILR for TIA. Monitor for AF (oac,toprol xl).   Hx AT/AFL/SVT.  (psvt 8/4/20). Last interrogation 12/9/20. No true AF recorded to date.    -CareAlert: Tachy  event on 12/19 @ 1743 x 68 sec, appears to be ST @ 140-150 bpm.  No symptom episodes recorded. - CareAlert: AF 19-Dec-2020 20:38 x 14 min.  egm shows artifact and appears to be ST also (130's). See PACEART report under Cardiology tab.

## 2020-12-31 ENCOUNTER — OFFICE VISIT (OUTPATIENT)
Dept: FAMILY MEDICINE CLINIC | Age: 75
End: 2020-12-31
Payer: MEDICARE

## 2020-12-31 VITALS
HEIGHT: 72 IN | BODY MASS INDEX: 26.55 KG/M2 | OXYGEN SATURATION: 97 % | WEIGHT: 196 LBS | SYSTOLIC BLOOD PRESSURE: 124 MMHG | HEART RATE: 64 BPM | DIASTOLIC BLOOD PRESSURE: 82 MMHG | TEMPERATURE: 96.8 F

## 2020-12-31 LAB
A/G RATIO: 2.2 (ref 1.1–2.2)
ALBUMIN SERPL-MCNC: 4.6 G/DL (ref 3.4–5)
ALP BLD-CCNC: 76 U/L (ref 40–129)
ALT SERPL-CCNC: 23 U/L (ref 10–40)
ANION GAP SERPL CALCULATED.3IONS-SCNC: 11 MMOL/L (ref 3–16)
AST SERPL-CCNC: 20 U/L (ref 15–37)
BILIRUB SERPL-MCNC: 0.4 MG/DL (ref 0–1)
BUN BLDV-MCNC: 36 MG/DL (ref 7–20)
CALCIUM SERPL-MCNC: 9.6 MG/DL (ref 8.3–10.6)
CHLORIDE BLD-SCNC: 106 MMOL/L (ref 99–110)
CHOLESTEROL, TOTAL: 180 MG/DL (ref 0–199)
CO2: 23 MMOL/L (ref 21–32)
CREAT SERPL-MCNC: 1 MG/DL (ref 0.8–1.3)
GFR AFRICAN AMERICAN: >60
GFR NON-AFRICAN AMERICAN: >60
GLOBULIN: 2.1 G/DL
GLUCOSE BLD-MCNC: 106 MG/DL (ref 70–99)
HDLC SERPL-MCNC: 61 MG/DL (ref 40–60)
LDL CHOLESTEROL CALCULATED: 106 MG/DL
POTASSIUM SERPL-SCNC: 5.4 MMOL/L (ref 3.5–5.1)
SODIUM BLD-SCNC: 140 MMOL/L (ref 136–145)
TOTAL PROTEIN: 6.7 G/DL (ref 6.4–8.2)
TRIGL SERPL-MCNC: 65 MG/DL (ref 0–150)
VLDLC SERPL CALC-MCNC: 13 MG/DL

## 2020-12-31 PROCEDURE — 3017F COLORECTAL CA SCREEN DOC REV: CPT | Performed by: NURSE PRACTITIONER

## 2020-12-31 PROCEDURE — 4040F PNEUMOC VAC/ADMIN/RCVD: CPT | Performed by: NURSE PRACTITIONER

## 2020-12-31 PROCEDURE — G8484 FLU IMMUNIZE NO ADMIN: HCPCS | Performed by: NURSE PRACTITIONER

## 2020-12-31 PROCEDURE — 1123F ACP DISCUSS/DSCN MKR DOCD: CPT | Performed by: NURSE PRACTITIONER

## 2020-12-31 PROCEDURE — G0439 PPPS, SUBSEQ VISIT: HCPCS | Performed by: NURSE PRACTITIONER

## 2020-12-31 PROCEDURE — 36415 COLL VENOUS BLD VENIPUNCTURE: CPT | Performed by: NURSE PRACTITIONER

## 2020-12-31 RX ORDER — LISINOPRIL 40 MG/1
TABLET ORAL
Qty: 90 TABLET | Refills: 3 | Status: SHIPPED | OUTPATIENT
Start: 2020-12-31 | End: 2021-03-11 | Stop reason: SDUPTHER

## 2020-12-31 RX ORDER — ZINC GLUCONATE 50 MG
50 TABLET ORAL DAILY
COMMUNITY

## 2020-12-31 ASSESSMENT — LIFESTYLE VARIABLES: HOW OFTEN DO YOU HAVE A DRINK CONTAINING ALCOHOL: 0

## 2020-12-31 ASSESSMENT — PATIENT HEALTH QUESTIONNAIRE - PHQ9
SUM OF ALL RESPONSES TO PHQ QUESTIONS 1-9: 0
1. LITTLE INTEREST OR PLEASURE IN DOING THINGS: 0
SUM OF ALL RESPONSES TO PHQ9 QUESTIONS 1 & 2: 0
SUM OF ALL RESPONSES TO PHQ QUESTIONS 1-9: 0
2. FEELING DOWN, DEPRESSED OR HOPELESS: 0
SUM OF ALL RESPONSES TO PHQ QUESTIONS 1-9: 0

## 2020-12-31 NOTE — PATIENT INSTRUCTIONS
Personalized Preventive Plan for Haleigh Lorenzo - 12/31/2020  Medicare offers a range of preventive health benefits. Some of the tests and screenings are paid in full while other may be subject to a deductible, co-insurance, and/or copay. Some of these benefits include a comprehensive review of your medical history including lifestyle, illnesses that may run in your family, and various assessments and screenings as appropriate. After reviewing your medical record and screening and assessments performed today your provider may have ordered immunizations, labs, imaging, and/or referrals for you. A list of these orders (if applicable) as well as your Preventive Care list are included within your After Visit Summary for your review. Other Preventive Recommendations:    · A preventive eye exam performed by an eye specialist is recommended every 1-2 years to screen for glaucoma; cataracts, macular degeneration, and other eye disorders. · A preventive dental visit is recommended every 6 months. · Try to get at least 150 minutes of exercise per week or 10,000 steps per day on a pedometer . · Order or download the FREE \"Exercise & Physical Activity: Your Everyday Guide\" from [employer]. Call 7-271.707.7823 or search [employer] online. · You need 5720-0457 mg of calcium and 6884-7801 IU of vitamin D per day. It is possible to meet your calcium requirement with diet alone, but a vitamin D supplement is usually necessary to meet this goal.  · When exposed to the sun, use a sunscreen that protects against both UVA and UVB radiation with an SPF of 30 or greater. Reapply every 2 to 3 hours or after sweating, drying off with a towel, or swimming. · Always wear a seat belt when traveling in a car. Always wear a helmet when riding a bicycle or motorcycle.

## 2020-12-31 NOTE — PROGRESS NOTES
Medicare Annual Wellness Visit  Name: Mica Blake Date: 2020   MRN: [id number] Sex: Male   Age: 77 y.o. Ethnicity: Non-/Non    : 1945 Race: Elise Rankin is here for Medicare AWV    Screenings for behavioral, psychosocial and functional/safety risks, and cognitive dysfunction are all negative except as indicated below. These results, as well as other patient data from the [address] form, are documented in Flowsheets linked to this Encounter. No Known Allergies      Prior to Visit Medications    Medication Sig Taking? Authorizing Provider   zinc 50 MG TABS tablet Take 50 mg by mouth daily Yes Historical Provider, MD   lisinopril (PRINIVIL;ZESTRIL) 40 MG tablet TAKE ONE TABLET BY MOUTH DAILY Yes KEIRY Garland CNP   Probiotic Product (PROBIOTIC DAILY PO) Take by mouth Yes Historical Provider, MD   COCONUT OIL PO Take by mouth Yes Historical Provider, MD   GARLIC PO Take 1 tablet by mouth 2 times daily Yes Historical Provider, MD   TURMERIC PO Take 1 tablet by mouth daily Yes Historical Provider, MD   B Complex Vitamins (VITAMIN-B COMPLEX) TABS Take 1 tablet by mouth daily Yes Historical Provider, MD   Coenzyme Q10 (COQ10 PO) Take 1 tablet by mouth 2 times daily Yes Historical Provider, MD   ALPHA LIPOIC ACID PO Take 1 tablet by mouth 2 times daily Yes Historical Provider, MD   ibuprofen (ADVIL;MOTRIN) 600 MG tablet Take 1 tablet by mouth every 6 hours as needed for Pain. Yes SIRIA Renteria   Vitamin D (CHOLECALCIFEROL) 1000 UNITS CAPS capsule Take 1,000 Units by mouth daily.    Yes Historical Provider, MD         Past Medical History:   Diagnosis Date    Arthritis     BPH (benign prostatic hyperplasia)     Cataract     bilateral    GERD (gastroesophageal reflux disease)     diverticulitis    HLD (hyperlipidemia) 2013    HTN (hypertension)     Nephrolithiasis     Pruritus     Tear of meniscus of knee     Left  TIA (transient ischemic attack) 9/11    Speech Aphasia       Past Surgical History:   Procedure Laterality Date    APPENDECTOMY      CATARACT REMOVAL WITH IMPLANT Left 04/19/2018    CATARACT REMOVAL WITH IMPLANT Right 05/17/2018    with dr Reba Cheadle  before 2005    CYSTOSCOPY      Ureteroscopy    HERNIA REPAIR Right     umbilical    INSERTABLE CARDIAC MONITOR  03/12/2020    KNEE ARTHROSCOPY      Left    KNEE ARTHROSCOPY  01/07/2014    right    MANDIBLE SURGERY      mandibular teresita bilateral    PLANTAR FASCIA SURGERY      SHOULDER ARTHROSCOPY      Left rotator cuff    SKIN BIOPSY      TONSILLECTOMY      TURP           Family History   Problem Relation Age of Onset    Heart Disease Father 61    High Blood Pressure Father     Stroke Father     Diabetes Brother     Heart Disease Brother         CHF    Stroke Brother        CareTeam (Including outside providers/suppliers regularly involved in providing care):   Patient Care Team:  KEIRY Guzman CNP as PCP - General (Nurse Practitioner Family)  KEIRY Guzman CNP as PCP - [facility] Empaneled Provider  Denice Perla MD as Consulting Physician (Dermatology)    Wt Readings from Last 3 Encounters:   12/31/20 196 lb (88.9 kg)   03/12/20 193 lb (87.5 kg)   03/05/20 193 lb (87.5 kg)     Vitals:    12/31/20 0858   BP: 124/82   Pulse: 64   Temp: 96.8 °F (36 °C)   TempSrc: Temporal   SpO2: 97%   Weight: 196 lb (88.9 kg)   Height: 6' (1.829 m)     Body mass index is 26.58 kg/m². Based upon direct observation of the patient, evaluation of cognition reveals recent and remote memory intact.     General Appearance: alert and oriented to person, place and time, well developed and well- nourished, in no acute distress  Skin: warm and dry, no rash or erythema  Head: normocephalic and atraumatic  Eyes: pupils equal, round, and reactive to light, extraocular eye movements intact, conjunctivae normal ENT: tympanic membrane, external ear and ear canal normal bilaterally, nose without deformity, nasal mucosa and turbinates normal without polyps  Neck: supple and non-tender without mass, no thyromegaly or thyroid nodules, no cervical lymphadenopathy  Pulmonary/Chest: clear to auscultation bilaterally- no wheezes, rales or rhonchi, normal air movement, no respiratory distress  Cardiovascular: normal rate, regular rhythm, normal S1 and S2, no murmurs, rubs, clicks, or gallops, distal pulses intact, no carotid bruits  Abdomen: soft, non-tender, non-distended, normal bowel sounds, no masses or organomegaly  Extremities: no cyanosis, clubbing or edema  Musculoskeletal: normal range of motion, no joint swelling, deformity or tenderness  Neurologic: reflexes normal and symmetric, no cranial nerve deficit, gait, coordination and speech normal    Patient's complete Health Risk Assessment and screening values have been reviewed and are found in Flowsheets. The following problems were reviewed today and where indicated follow up appointments were made and/or referrals ordered. Positive Risk Factor Screenings with Interventions:            General Health and ACP:  General  In general, how would you say your health is?: Excellent  In the past 7 days, have you experienced any of the following?  New or Increased Pain, New or Increased Fatigue, Loneliness, Social Isolation, Stress or Anger?: None of These  Do you get the social and emotional support that you need?: Yes  Do you have a Living Will?: (!) No  Advance Directives     Power of [address] [place] [address] Will ACP-Advance Directive ACP-Power of     Not on File Filed on 01/14/16 Filed Not on File      General Health Risk Interventions:  · states he has one-states sister is all he has left and condo will go to his niece    Health Habits/Nutrition:  Health Habits/Nutrition  Do you exercise for at least 20 minutes 2-3 times per week?: Yes Have you lost any weight without trying in the past 3 months?: No  Do you eat fewer than 2 meals per day?: No  Have you seen a dentist within the past year?: (!) No  Body mass index: (!) 26.58  Health Habits/Nutrition Interventions:  · Dental exam overdue:  patient encouraged to make appointment with his/her dentist    Hearing/Vision:  No exam data present  Hearing/Vision  Do you or your family notice any trouble with your hearing?: No  Do you have difficulty driving, watching TV, or doing any of your daily activities because of your eyesight?: No  Have you had an eye exam within the past year?: (!) No  Hearing/Vision Interventions:  · Vision concerns:  patient encouraged to make appointment with his/her eye specialist had cataracts and now has lenses       Personalized Preventive Plan   Current Health Maintenance Status  Immunization History   Administered Date(s) Administered    Td, unspecified formulation 07/07/2009        Health Maintenance   Topic Date Due    DTaP/Tdap/Td vaccine (1 - Tdap) 05/22/1965    Shingles Vaccine (1 of 2) 05/22/1996    Annual Wellness Visit (AWV)  03/22/2020    Pneumococcal 65+ years Vaccine (1 of 1 - PPSV23) 12/31/2020 (Originally 5/22/2011)    Flu vaccine (1) 12/31/2021 (Originally 9/1/2020)    Potassium monitoring  03/12/2021    Creatinine monitoring  03/12/2021    Lipid screen  01/30/2025    Colon cancer screen colonoscopy  04/19/2026    AAA screen  Completed    Hepatitis C screen  Completed    Hepatitis A vaccine  Aged Out    Hepatitis B vaccine  Aged Out    Hib vaccine  Aged Out    Meningococcal (ACWY) vaccine  Aged Out     Recommendations for [place] Due: see orders and patient instructions/AVS.  . Recommended screening schedule for the next 5-10 years is provided to the patient in written form: see Patient Instructions/AVS.    Clay Costa was seen today for medicare awv.     Diagnoses and all orders for this visit: Routine general medical examination at a health care facility    Essential hypertension  -     Comprehensive Metabolic Panel  -     lisinopril (PRINIVIL;ZESTRIL) 40 MG tablet; TAKE ONE TABLET BY MOUTH DAILY    High cholesterol  -     Lipid Panel

## 2021-01-13 ENCOUNTER — NURSE ONLY (OUTPATIENT)
Dept: CARDIOLOGY CLINIC | Age: 76
End: 2021-01-13
Payer: MEDICARE

## 2021-01-13 DIAGNOSIS — Z45.09 ENCOUNTER FOR LOOP RECORDER CHECK: ICD-10-CM

## 2021-01-13 DIAGNOSIS — G45.9 TIA (TRANSIENT ISCHEMIC ATTACK): ICD-10-CM

## 2021-01-13 DIAGNOSIS — I47.1 ATRIAL TACHYCARDIA (HCC): ICD-10-CM

## 2021-01-13 DIAGNOSIS — I48.92 ATRIAL FLUTTER, UNSPECIFIED TYPE (HCC): ICD-10-CM

## 2021-01-13 PROCEDURE — G2066 INTER DEVC REMOTE 30D: HCPCS | Performed by: INTERNAL MEDICINE

## 2021-01-13 PROCEDURE — 93298 REM INTERROG DEV EVAL SCRMS: CPT | Performed by: INTERNAL MEDICINE

## 2021-01-14 NOTE — PROGRESS NOTES
ILR for TIA. Monitor for AF (oac,toprol xl).   Hx AT/AFL/SVT.  (psvt 8/4/20). No AF recorded to date. (AF recordings have shown SR/ST w/ artifact). Tachycardia on 1/13/21 shows pAT x 18 sec. Tachy 19-Dec-2020 17:43  X 68 sec shows ST.  AF recording on 19-Dec-2020 shows SR/ST w/ pvc's. Average v rates 60-80 bpm.  Follow up 1 month via carelink.

## 2021-02-17 ENCOUNTER — NURSE ONLY (OUTPATIENT)
Dept: CARDIOLOGY CLINIC | Age: 76
End: 2021-02-17
Payer: MEDICARE

## 2021-02-17 DIAGNOSIS — Z45.09 ENCOUNTER FOR LOOP RECORDER CHECK: ICD-10-CM

## 2021-02-17 DIAGNOSIS — I47.1 ATRIAL TACHYCARDIA (HCC): ICD-10-CM

## 2021-02-17 DIAGNOSIS — G45.9 TIA (TRANSIENT ISCHEMIC ATTACK): ICD-10-CM

## 2021-02-17 PROCEDURE — 93298 REM INTERROG DEV EVAL SCRMS: CPT | Performed by: INTERNAL MEDICINE

## 2021-02-17 PROCEDURE — G2066 INTER DEVC REMOTE 30D: HCPCS | Performed by: INTERNAL MEDICINE

## 2021-02-17 NOTE — PROGRESS NOTES
ILR for TIA. Monitor for AF (oac,toprol xl).   Hx AT/AFL/SVT.  (psvt 8/4/20). No AF recorded to date. (AF recordings have shown SR/ST w/ artifact). 11-Jan-2021 00:05 to 04-Feb-2021 00:05. LAST INTERROGATION 1/13/21. No new arrhythmias or events. Follow up 1 month via carelink.

## 2021-02-17 NOTE — LETTER
[address] [facility] [phone]  Luige Ankit [address]  [address] [address] [phone]    Pacemaker/Defibrillator Clinic          02/17/21        [address] [place] [address]        Dear [address] [place] [address]    This letter is to inform you that we received the transmission from your monitor at home that checks your implanted heart device. The next date your monitor will automatically transmit will be 3/24. No AFib recorded. .  If your report needs attention we will notify you. Your device and monitor are wireless and most transmit cellularly, but please periodically check your monitor is still plugged in to the electrical outlet. If you still use the telephone land line to send please ensure the connection to the phone nel is secure. This will help to ensure successful automatic transmissions in the future. Also, the monitor needs to be close to you while sleeping at night. Please be aware that the remote device transmission sites are periodically monitored only during regular business hours during which simultaneous in-office device clinics are being run. If your transmission requires attention, we will contact you as soon as possible. Thank you.             [facility]

## 2021-03-11 ENCOUNTER — TELEPHONE (OUTPATIENT)
Dept: FAMILY MEDICINE CLINIC | Age: 76
End: 2021-03-11

## 2021-03-11 DIAGNOSIS — I10 ESSENTIAL HYPERTENSION: ICD-10-CM

## 2021-03-11 RX ORDER — LISINOPRIL 40 MG/1
TABLET ORAL
Qty: 90 TABLET | Refills: 3 | Status: SHIPPED | OUTPATIENT
Start: 2021-03-11 | End: 2022-03-10 | Stop reason: SDUPTHER

## 2021-03-24 ENCOUNTER — NURSE ONLY (OUTPATIENT)
Dept: CARDIOLOGY CLINIC | Age: 76
End: 2021-03-24
Payer: MEDICARE

## 2021-03-24 DIAGNOSIS — I48.92 ATRIAL FLUTTER, UNSPECIFIED TYPE (HCC): ICD-10-CM

## 2021-03-24 DIAGNOSIS — I47.1 ATRIAL TACHYCARDIA (HCC): ICD-10-CM

## 2021-03-24 DIAGNOSIS — Z45.09 ENCOUNTER FOR LOOP RECORDER CHECK: ICD-10-CM

## 2021-03-24 DIAGNOSIS — G45.9 TIA (TRANSIENT ISCHEMIC ATTACK): ICD-10-CM

## 2021-03-24 PROCEDURE — G2066 INTER DEVC REMOTE 30D: HCPCS | Performed by: INTERNAL MEDICINE

## 2021-03-24 PROCEDURE — 93298 REM INTERROG DEV EVAL SCRMS: CPT | Performed by: INTERNAL MEDICINE

## 2021-03-24 NOTE — LETTER
[facility] [phone]  [address] [address] [phone]    Pacemaker/Defibrillator Clinic          03/25/21        [address] [place] [address]        Dear [address] [place] [address]    This letter is to inform you that we received the transmission from your monitor at home that checks your implanted heart device. The next date your monitor will automatically transmit will be 4/28. No AFib recorded to date. .  If your report needs attention we will notify you. Your device and monitor are wireless and most transmit cellularly, but please periodically check your monitor is still plugged in to the electrical outlet. If you still use the telephone land line to send please ensure the connection to the phone nel is secure. This will help to ensure successful automatic transmissions in the future. Also, the monitor needs to be close to you while sleeping at night. Please be aware that the remote device transmission sites are periodically monitored only during regular business hours during which simultaneous in-office device clinics are being run. If your transmission requires attention, we will contact you as soon as possible. Thank you.             [facility]

## 2021-03-24 NOTE — PROGRESS NOTES
Remote interrogation of implanted cardiac event monitor shows normal function. ILR for TIA. Monitor for AF (oac,toprol xl).   Hx AT/AFL/SVT.  (psvt 8/4/20). No AF recorded to date.  (AF recordings have shown SR/ST w/ artifact). No  arrhythmias recorded. Follow up 1 month via carelink.

## 2021-04-28 ENCOUNTER — NURSE ONLY (OUTPATIENT)
Dept: CARDIOLOGY CLINIC | Age: 76
End: 2021-04-28
Payer: MEDICARE

## 2021-04-28 DIAGNOSIS — I47.1 ATRIAL TACHYCARDIA (HCC): ICD-10-CM

## 2021-04-28 DIAGNOSIS — Z45.09 ENCOUNTER FOR LOOP RECORDER CHECK: ICD-10-CM

## 2021-04-28 DIAGNOSIS — I48.92 ATRIAL FLUTTER, UNSPECIFIED TYPE (HCC): ICD-10-CM

## 2021-04-28 DIAGNOSIS — G45.9 TIA (TRANSIENT ISCHEMIC ATTACK): ICD-10-CM

## 2021-04-28 PROCEDURE — 93298 REM INTERROG DEV EVAL SCRMS: CPT | Performed by: INTERNAL MEDICINE

## 2021-04-28 PROCEDURE — G2066 INTER DEVC REMOTE 30D: HCPCS | Performed by: INTERNAL MEDICINE

## 2021-04-28 NOTE — PROGRESS NOTES
ILR for TIA. Monitor for AF (oac,toprol xl).   Hx AT/AFL/SVT.  (psvt 8/4/20). No AF recorded to date.  (AF recordings have shown SR/ST w/ artifact). Remote interrogation of implanted cardiac event monitor shows normal function. No  arrhythmias recorded. Follow up 1 month via carelink.

## 2021-04-28 NOTE — LETTER
[address] [facility] [phone]  Luige Ankit [address]  [address] [facility] [phone]    Pacemaker/Defibrillator Clinic          05/03/21        [address] [place] [address]        Dear [address] [place] [address]    This letter is to inform you that we received the transmission from your monitor at home that checks your implanted heart device. The next date your monitor will automatically transmit will be 6/2. If your report needs attention we will notify you. Your device and monitor are wireless and most transmit cellularly, but please periodically check your monitor is still plugged in to the electrical outlet. If you still use the telephone land line to send please ensure the connection to the phone nel is secure. This will help to ensure successful automatic transmissions in the future. Also, the monitor needs to be close to you while sleeping at night. Please be aware that the remote device transmission sites are periodically monitored only during regular business hours during which simultaneous in-office device clinics are being run. If your transmission requires attention, we will contact you as soon as possible. Thank you.             Laura

## 2021-06-02 ENCOUNTER — NURSE ONLY (OUTPATIENT)
Dept: CARDIOLOGY CLINIC | Age: 76
End: 2021-06-02
Payer: MEDICARE

## 2021-06-02 DIAGNOSIS — I47.1 ATRIAL TACHYCARDIA (HCC): ICD-10-CM

## 2021-06-02 DIAGNOSIS — G45.9 TIA (TRANSIENT ISCHEMIC ATTACK): ICD-10-CM

## 2021-06-02 DIAGNOSIS — I48.92 ATRIAL FLUTTER, UNSPECIFIED TYPE (HCC): ICD-10-CM

## 2021-06-02 DIAGNOSIS — Z45.09 ENCOUNTER FOR LOOP RECORDER CHECK: ICD-10-CM

## 2021-06-02 PROCEDURE — 93298 REM INTERROG DEV EVAL SCRMS: CPT | Performed by: INTERNAL MEDICINE

## 2021-06-02 PROCEDURE — G2066 INTER DEVC REMOTE 30D: HCPCS | Performed by: INTERNAL MEDICINE

## 2021-06-02 NOTE — PROGRESS NOTES
Remote interrogation of implanted cardiac event monitor shows normal function. Patient has a history of A Flutter, AT, and TIA. Patient's last device interrogation was on 4/28. Since last interrogation, no arrhythmias recorded. SOFIYA HARMON to review. See Paceart report under the Cardiology tab. Follow up 1 month via Carelink.

## 2021-06-02 NOTE — LETTER
[address] [facility] [phone]  Luige Ankit [address]  [address] [address] [phone]    Pacemaker/Defibrillator Clinic    06/02/21      [address] [place] [address]      Dear [address] [place] [address]    This letter is to inform you that we received the transmission from your monitor at home that checks your implanted heart device. The next date your monitor will automatically transmit will be 7/7/21. If your report needs attention we will notify you. Your device and monitor are wireless and most transmit cellularly, but please periodically check your monitor is still plugged in to the electrical outlet. If you still use the telephone land line to send please ensure the connection to the phone nel is secure. This will help to ensure successful automatic transmissions in the future. Also, the monitor needs to be close to you while sleeping at night. Please be aware that the remote device transmission sites are periodically monitored only during regular business hours during which simultaneous in-office device clinics are being run. If your transmission requires attention, we will contact you as soon as possible. **PLEASE NOTE** that our [facility] policy and processes are changing to ensure a more seamless approach for all parties involved, allowing more time for our nurses to address patient issues and concerns. We will no longer be sending letters for NORMAL remote transmissions. You will be contacted by phone if your transmission requires attention (as previously done), and letters will only be sent regarding monitor disconnections or missed transmissions if you are unable to be reached by phone. Please do not be alarmed by this new process, as we will continue to contact you if your transmission report requires attention. This will be your final \"remote received\" letter.   From this point forward, the [facility] will be utilizing the no news is good news approach. As always, please feel free to contact your nurse with any questions or concerns. Thank you.     [facility]

## 2021-07-07 ENCOUNTER — NURSE ONLY (OUTPATIENT)
Dept: CARDIOLOGY CLINIC | Age: 76
End: 2021-07-07
Payer: MEDICARE

## 2021-07-07 DIAGNOSIS — I47.1 ATRIAL TACHYCARDIA (HCC): ICD-10-CM

## 2021-07-07 DIAGNOSIS — I48.92 ATRIAL FLUTTER, UNSPECIFIED TYPE (HCC): ICD-10-CM

## 2021-07-07 DIAGNOSIS — G45.9 TIA (TRANSIENT ISCHEMIC ATTACK): ICD-10-CM

## 2021-07-07 DIAGNOSIS — Z45.09 ENCOUNTER FOR LOOP RECORDER CHECK: ICD-10-CM

## 2021-07-07 PROCEDURE — 93298 REM INTERROG DEV EVAL SCRMS: CPT | Performed by: INTERNAL MEDICINE

## 2021-07-07 PROCEDURE — G2066 INTER DEVC REMOTE 30D: HCPCS | Performed by: INTERNAL MEDICINE

## 2021-07-07 NOTE — PROGRESS NOTES
Remote transmission received for patients ILR. EP physician will review. See interrogation under the cardiology tab in the [address] [facility] [address] field for more details. Will continue to monitor remotely. ILR for TIA. Monitor for AF (oac,toprol xl).   Hx AT/AFL/SVT.  (psvt 8/4/20). No AF recorded to date.  (AF recordings have shown SR/ST w/ artifact). No new arrhythmias/events recorded.

## 2021-08-05 PROCEDURE — G2066 INTER DEVC REMOTE 30D: HCPCS | Performed by: INTERNAL MEDICINE

## 2021-08-05 PROCEDURE — 93298 REM INTERROG DEV EVAL SCRMS: CPT | Performed by: INTERNAL MEDICINE

## 2021-08-11 ENCOUNTER — NURSE ONLY (OUTPATIENT)
Dept: CARDIOLOGY CLINIC | Age: 76
End: 2021-08-11

## 2021-08-11 DIAGNOSIS — Z45.09 ENCOUNTER FOR LOOP RECORDER CHECK: ICD-10-CM

## 2021-08-11 DIAGNOSIS — I47.1 ATRIAL TACHYCARDIA (HCC): ICD-10-CM

## 2021-08-11 NOTE — PROGRESS NOTES
We received a remote transmission from patient's monitor at home. Remote Linq report shows no arrhythmias. Pause recording is not real. Noted under sensing. EP physician to review. We will continue to monitor remotely.

## 2021-09-15 ENCOUNTER — NURSE ONLY (OUTPATIENT)
Dept: CARDIOLOGY CLINIC | Age: 76
End: 2021-09-15
Payer: MEDICARE

## 2021-09-15 DIAGNOSIS — G45.9 TIA (TRANSIENT ISCHEMIC ATTACK): ICD-10-CM

## 2021-09-15 DIAGNOSIS — Z45.09 ENCOUNTER FOR LOOP RECORDER CHECK: ICD-10-CM

## 2021-09-15 DIAGNOSIS — I47.1 ATRIAL TACHYCARDIA (HCC): ICD-10-CM

## 2021-09-15 DIAGNOSIS — I48.92 ATRIAL FLUTTER, UNSPECIFIED TYPE (HCC): ICD-10-CM

## 2021-09-15 NOTE — PROGRESS NOTES
Notice of Disconnected Monitor Disconnected Monitor: Snoozed - See Patient Details    Disconnected notification snoozed for 15 days on: 14-Sep-2021  Last communication occurred on:20-Aug-2021  (26 days ago)    LETTER SENT TO PT TO [facility]. ILR for TIA. Monitor for AF (oac,toprol xl).   Hx AT/AFL/SVT.  (psvt 8/4/20). No AF recorded to date.  (AF recordings have shown SR/ST w/ artifact). Last Data Send:18-Sep-2021  Observations  20-Aug-2021 00:05 to 18-Sep-2021 00:05  - CareAlert: Pause-undersensing noted. - CareAlert: AF (no egm to view). PT NEEDS TO SEND MANUAL TRANSMISSION.

## 2021-09-21 PROCEDURE — 93298 REM INTERROG DEV EVAL SCRMS: CPT | Performed by: INTERNAL MEDICINE

## 2021-09-21 PROCEDURE — G2066 INTER DEVC REMOTE 30D: HCPCS | Performed by: INTERNAL MEDICINE

## 2021-10-20 ENCOUNTER — NURSE ONLY (OUTPATIENT)
Dept: CARDIOLOGY CLINIC | Age: 76
End: 2021-10-20
Payer: MEDICARE

## 2021-10-20 DIAGNOSIS — G45.9 TIA (TRANSIENT ISCHEMIC ATTACK): ICD-10-CM

## 2021-10-20 DIAGNOSIS — Z45.09 ENCOUNTER FOR LOOP RECORDER CHECK: ICD-10-CM

## 2021-10-20 DIAGNOSIS — I47.1 ATRIAL TACHYCARDIA (HCC): ICD-10-CM

## 2021-10-20 DIAGNOSIS — I48.92 ATRIAL FLUTTER, UNSPECIFIED TYPE (HCC): ICD-10-CM

## 2021-10-22 NOTE — PROGRESS NOTES
Remote transmission received for patients ILR. EP physician will review. See interrogation under the cardiology tab in the [address] [facility] [address] field for more details. Will continue to monitor remotely. ILR for TIA. Monitor for AF (oac,toprol xl).   Hx AT/AFL/SVT.  (psvt 8/4/20). No AF recorded to date.  (AF recordings have shown SR/ST w/ artifact). Pause recordings are under sensed. Tachycardia episode show ST w/ ectopy. No symptom activated events.

## 2021-10-25 PROCEDURE — G2066 INTER DEVC REMOTE 30D: HCPCS | Performed by: INTERNAL MEDICINE

## 2021-10-25 PROCEDURE — 93298 REM INTERROG DEV EVAL SCRMS: CPT | Performed by: INTERNAL MEDICINE

## 2021-11-19 NOTE — PROGRESS NOTES
Remote transmission received for patients ILR.   EP physician will review.  See interrogation under the cardiology tab in the [address] [facility] [address] field for more details.  Will continue to monitor remotely. ILR for TIA. Monitor for AF (oac,toprol xl).   Hx AT/AFL/SVT.  (psvt 8/4/20). No AF recorded to date.  (AF recordings have shown SR/ST w/ artifact). Pause recording is under sensed and AF recording shows sinus w/ ectopy. No symptom activated events.

## 2021-11-24 ENCOUNTER — NURSE ONLY (OUTPATIENT)
Dept: CARDIOLOGY CLINIC | Age: 76
End: 2021-11-24

## 2021-11-24 DIAGNOSIS — Z45.09 ENCOUNTER FOR LOOP RECORDER CHECK: ICD-10-CM

## 2021-11-24 DIAGNOSIS — I47.1 ATRIAL TACHYCARDIA (HCC): ICD-10-CM

## 2021-11-24 DIAGNOSIS — G45.9 TIA (TRANSIENT ISCHEMIC ATTACK): ICD-10-CM

## 2021-11-24 DIAGNOSIS — I48.92 ATRIAL FLUTTER, UNSPECIFIED TYPE (HCC): ICD-10-CM

## 2021-11-24 PROCEDURE — 93298 REM INTERROG DEV EVAL SCRMS: CPT | Performed by: INTERNAL MEDICINE

## 2021-11-24 PROCEDURE — G2066 INTER DEVC REMOTE 30D: HCPCS | Performed by: INTERNAL MEDICINE

## 2021-12-27 NOTE — PROGRESS NOTES
Notice of Disconnected Monitor Disconnected Monitor: Snoozed - See Patient Details    Disconnected notification snoozed for 15 days on: 17-Dec-2021  Last communication occurred on:01-Dec-2021  (25 days ago)  LETTER SENT TO PT TO [address] [place] [address]. ILR for TIA. Monitor for AF (oac,toprol xl).   Hx AT/AFL/SVT.  (psvt 8/4/20). No AF recorded to date.  (AF recordings have shown SR/ST w/ artifact).

## 2021-12-29 ENCOUNTER — NURSE ONLY (OUTPATIENT)
Dept: CARDIOLOGY CLINIC | Age: 76
End: 2021-12-29

## 2021-12-29 DIAGNOSIS — Z45.09 ENCOUNTER FOR LOOP RECORDER CHECK: ICD-10-CM

## 2021-12-29 NOTE — LETTER
[address] [place] [address] [phone]  [address] [place] [address]  Hua Bal- [phone]    Pacemaker/Defibrillator Clinic          12/27/21        [address] [place] [address]        Dear [address] [place] [address]    This letter is to inform you that we did not receive your recently scheduled pacemaker and/or defibrillator, or implanted heart monitor, cellular or telephone transmission. At your earliest convenience, please send a transmission using your home monitor so we can ensure your cardiac device is functioning normally. For future transmissions, please keep in mind that we have an appointment date scheduled for you for this and staff allocated to receive your transmission on your scheduled day. You will receive a reminder to transmit call the day before from our office. If you are having problems sending your transmission, please call the toll free number on your equipment or below for assistance. [employer], Carelink    [phone]  Disconnected notification snoozed for 15 days on: 17-Dec-2021  Last communication occurred on:01-Dec-2021      Thank you.        Laura

## 2022-01-13 ENCOUNTER — TELEPHONE (OUTPATIENT)
Dept: CARDIOLOGY CLINIC | Age: 77
End: 2022-01-13

## 2022-01-13 NOTE — TELEPHONE ENCOUNTER
Pete Gallo)  Letter received from the patient stating he wishes to d/c remote monitoring because it is not covered by his insurance. Patient has a Linq implanted 3/12/20 for TIA, watch for AF. Not on [facility]. Per Connie Dejesus we will send a financial assistance form to the patient in addition to your response. [address] [facility] how would you like to proceed?

## 2022-01-13 NOTE — TELEPHONE ENCOUNTER
Letter sent to patient with financial assistance forms. We will waive professional fee for device checks.

## 2022-01-13 NOTE — TELEPHONE ENCOUNTER
Lets check with Kriss Cheatham, that is truly surprising. I am willing to waive my professional fee if that would be helpful. Thanks.

## 2022-03-04 SDOH — HEALTH STABILITY: PHYSICAL HEALTH: ON AVERAGE, HOW MANY MINUTES DO YOU ENGAGE IN EXERCISE AT THIS LEVEL?: 150+ MIN

## 2022-03-04 SDOH — HEALTH STABILITY: PHYSICAL HEALTH: ON AVERAGE, HOW MANY DAYS PER WEEK DO YOU ENGAGE IN MODERATE TO STRENUOUS EXERCISE (LIKE A BRISK WALK)?: 3 DAYS

## 2022-03-04 ASSESSMENT — PATIENT HEALTH QUESTIONNAIRE - PHQ9
SUM OF ALL RESPONSES TO PHQ QUESTIONS 1-9: 0
2. FEELING DOWN, DEPRESSED OR HOPELESS: 0
1. LITTLE INTEREST OR PLEASURE IN DOING THINGS: 0
SUM OF ALL RESPONSES TO PHQ QUESTIONS 1-9: 0
SUM OF ALL RESPONSES TO PHQ9 QUESTIONS 1 & 2: 0

## 2022-03-04 ASSESSMENT — LIFESTYLE VARIABLES
HOW OFTEN DO YOU HAVE A DRINK CONTAINING ALCOHOL: 1
HOW OFTEN DO YOU HAVE SIX OR MORE DRINKS ON ONE OCCASION: 1
HOW OFTEN DO YOU HAVE A DRINK CONTAINING ALCOHOL: NEVER

## 2022-03-10 ENCOUNTER — OFFICE VISIT (OUTPATIENT)
Dept: FAMILY MEDICINE CLINIC | Age: 77
End: 2022-03-10
Payer: MEDICARE

## 2022-03-10 VITALS
HEIGHT: 72 IN | DIASTOLIC BLOOD PRESSURE: 68 MMHG | HEART RATE: 75 BPM | SYSTOLIC BLOOD PRESSURE: 110 MMHG | BODY MASS INDEX: 25.73 KG/M2 | OXYGEN SATURATION: 98 % | WEIGHT: 190 LBS

## 2022-03-10 DIAGNOSIS — Z00.00 MEDICARE ANNUAL WELLNESS VISIT, SUBSEQUENT: Primary | ICD-10-CM

## 2022-03-10 DIAGNOSIS — I10 ESSENTIAL HYPERTENSION: ICD-10-CM

## 2022-03-10 PROCEDURE — G8484 FLU IMMUNIZE NO ADMIN: HCPCS | Performed by: NURSE PRACTITIONER

## 2022-03-10 PROCEDURE — 36415 COLL VENOUS BLD VENIPUNCTURE: CPT | Performed by: NURSE PRACTITIONER

## 2022-03-10 PROCEDURE — 3017F COLORECTAL CA SCREEN DOC REV: CPT | Performed by: NURSE PRACTITIONER

## 2022-03-10 PROCEDURE — G0439 PPPS, SUBSEQ VISIT: HCPCS | Performed by: NURSE PRACTITIONER

## 2022-03-10 PROCEDURE — 4040F PNEUMOC VAC/ADMIN/RCVD: CPT | Performed by: NURSE PRACTITIONER

## 2022-03-10 PROCEDURE — 1123F ACP DISCUSS/DSCN MKR DOCD: CPT | Performed by: NURSE PRACTITIONER

## 2022-03-10 RX ORDER — LISINOPRIL 40 MG/1
TABLET ORAL
Qty: 90 TABLET | Refills: 3 | Status: SHIPPED | OUTPATIENT
Start: 2022-03-10

## 2022-03-10 NOTE — PATIENT INSTRUCTIONS
Personalized Preventive Plan for Jari Castleman - 3/10/2022  Medicare offers a range of preventive health benefits. Some of the tests and screenings are paid in full while other may be subject to a deductible, co-insurance, and/or copay. Some of these benefits include a comprehensive review of your medical history including lifestyle, illnesses that may run in your family, and various assessments and screenings as appropriate. After reviewing your medical record and screening and assessments performed today your provider may have ordered immunizations, labs, imaging, and/or referrals for you. A list of these orders (if applicable) as well as your Preventive Care list are included within your After Visit Summary for your review. Other Preventive Recommendations:    · A preventive eye exam performed by an eye specialist is recommended every 1-2 years to screen for glaucoma; cataracts, macular degeneration, and other eye disorders. · A preventive dental visit is recommended every 6 months. · Try to get at least 150 minutes of exercise per week or 10,000 steps per day on a pedometer . · Order or download the FREE \"Exercise & Physical Activity: Your Everyday Guide\" from [employer]. Call 8-101.807.3921 or search [employer] online. · You need 2994-7812 mg of calcium and 5421-2849 IU of vitamin D per day. It is possible to meet your calcium requirement with diet alone, but a vitamin D supplement is usually necessary to meet this goal.  · When exposed to the sun, use a sunscreen that protects against both UVA and UVB radiation with an SPF of 30 or greater. Reapply every 2 to 3 hours or after sweating, drying off with a towel, or swimming. · Always wear a seat belt when traveling in a car. Always wear a helmet when riding a bicycle or motorcycle.

## 2022-03-10 NOTE — PROGRESS NOTES
Medicare Annual Wellness Visit    Lisset Alatorre is here for Medicare AW    Assessment & Plan   Essential hypertension  -     lisinopril (PRINIVIL;ZESTRIL) 40 MG tablet; TAKE ONE TABLET BY MOUTH DAILY, Disp-90 tablet, R-3Normal  -     Lipid Panel  -     Comprehensive Metabolic Panel      Recommendations for Preventive Services Due: see orders and patient instructions/AVS.  Recommended screening schedule for the next 5-10 years is provided to the patient in written form: see Patient Instructions/AVS.     No follow-ups on file. Subjective       Patient's complete Health Risk Assessment and screening values have been reviewed and are found in Flowsheets. The following problems were reviewed today and where indicated follow up appointments were made and/or referrals ordered. Positive Risk Factor Screenings with Interventions:              Health Habits/Nutrition:     Physical Activity: Sufficiently Active    Days of Exercise per Week: 3 days    Minutes of Exercise per Session: 150+ min     Have you lost any weight without trying in the past 3 months?: No  Body mass index: (!) 25.77  Have you seen the dentist within the past year?: (!) No    Health Habits/Nutrition Interventions:  · Dental exam overdue:  patient encouraged to make appointment with his/her dentist    Hearing/Vision:  Do you or your family notice any trouble with your hearing that hasn't been managed with hearing aids?: No  Do you have difficulty driving, watching TV, or doing any of your daily activities because of your eyesight?: No  Have you had an eye exam within the past year?: (!) No  No exam data present    Hearing/Vision Interventions:  · Vision concerns:  had cataracts corrected-sees well             Objective   Vitals:    03/10/22 1451   BP: 110/68   Pulse: 75   SpO2: 98%   Weight: 190 lb (86.2 kg)   Height: 6' (1.829 m)      Body mass index is 25.77 kg/m².                No Known Allergies  Prior to Visit Medications    Medication Sig Taking? Authorizing Provider   Omega-3 Fatty Acids (OMEGA-3 FISH OIL PO) Take by mouth Yes Historical Provider, MD   lisinopril (PRINIVIL;ZESTRIL) 40 MG tablet TAKE ONE TABLET BY MOUTH DAILY Yes KEIRY Vallejo CNP   zinc 50 MG TABS tablet Take 50 mg by mouth daily Yes Historical Provider, MD   Probiotic Product (PROBIOTIC DAILY PO) Take by mouth Yes Historical Provider, MD   COCONUT OIL PO Take by mouth Yes Historical Provider, MD   GARLIC PO Take 1 tablet by mouth 2 times daily Yes Historical Provider, MD   TURMERIC PO Take 1 tablet by mouth daily Yes Historical Provider, MD   B Complex Vitamins (VITAMIN-B COMPLEX) TABS Take 1 tablet by mouth daily Yes Historical Provider, MD   Coenzyme Q10 (COQ10 PO) Take 1 tablet by mouth 2 times daily Yes Historical Provider, MD   ALPHA LIPOIC ACID PO Take 1 tablet by mouth 2 times daily Yes Historical Provider, MD   ibuprofen (ADVIL;MOTRIN) 600 MG tablet Take 1 tablet by mouth every 6 hours as needed for Pain. Yes SIRIA Rosales   Vitamin D (CHOLECALCIFEROL) 1000 UNITS CAPS capsule Take 1,000 Units by mouth daily.    Yes Historical Provider, MD Ng (Including outside providers/suppliers regularly involved in providing care):   Patient Care Team:  KEIRY Vallejo CNP as PCP - General (Nurse Practitioner Family)  KEIRY Vallejo CNP as PCP - [facility]  Charmaine Marcum MD as Consulting Physician (Dermatology)    Reviewed and updated this visit:  Tobacco  Allergies  Meds  Med Hx  Surg Hx  Soc Hx  Fam Hx

## 2022-03-11 LAB
A/G RATIO: 2 (ref 1.1–2.2)
ALBUMIN SERPL-MCNC: 4.3 G/DL (ref 3.4–5)
ALP BLD-CCNC: 112 U/L (ref 40–129)
ALT SERPL-CCNC: 18 U/L (ref 10–40)
ANION GAP SERPL CALCULATED.3IONS-SCNC: 15 MMOL/L (ref 3–16)
AST SERPL-CCNC: 18 U/L (ref 15–37)
BILIRUB SERPL-MCNC: <0.2 MG/DL (ref 0–1)
BUN BLDV-MCNC: 41 MG/DL (ref 7–20)
CALCIUM SERPL-MCNC: 9.2 MG/DL (ref 8.3–10.6)
CHLORIDE BLD-SCNC: 104 MMOL/L (ref 99–110)
CHOLESTEROL, TOTAL: 175 MG/DL (ref 0–199)
CO2: 20 MMOL/L (ref 21–32)
CREAT SERPL-MCNC: 1.2 MG/DL (ref 0.8–1.3)
GFR AFRICAN AMERICAN: >60
GFR NON-AFRICAN AMERICAN: 59
GLUCOSE BLD-MCNC: 101 MG/DL (ref 70–99)
HDLC SERPL-MCNC: 64 MG/DL (ref 40–60)
LDL CHOLESTEROL CALCULATED: 98 MG/DL
POTASSIUM SERPL-SCNC: 4.8 MMOL/L (ref 3.5–5.1)
SODIUM BLD-SCNC: 139 MMOL/L (ref 136–145)
TOTAL PROTEIN: 6.4 G/DL (ref 6.4–8.2)
TRIGL SERPL-MCNC: 63 MG/DL (ref 0–150)
VLDLC SERPL CALC-MCNC: 13 MG/DL

## 2023-02-24 SDOH — HEALTH STABILITY: PHYSICAL HEALTH: ON AVERAGE, HOW MANY MINUTES DO YOU ENGAGE IN EXERCISE AT THIS LEVEL?: 60 MIN

## 2023-02-24 SDOH — HEALTH STABILITY: PHYSICAL HEALTH: ON AVERAGE, HOW MANY DAYS PER WEEK DO YOU ENGAGE IN MODERATE TO STRENUOUS EXERCISE (LIKE A BRISK WALK)?: 7 DAYS

## 2023-02-24 ASSESSMENT — LIFESTYLE VARIABLES
HOW MANY STANDARD DRINKS CONTAINING ALCOHOL DO YOU HAVE ON A TYPICAL DAY: 0
HOW OFTEN DO YOU HAVE A DRINK CONTAINING ALCOHOL: 1
HOW MANY STANDARD DRINKS CONTAINING ALCOHOL DO YOU HAVE ON A TYPICAL DAY: PATIENT DOES NOT DRINK
HOW OFTEN DO YOU HAVE A DRINK CONTAINING ALCOHOL: NEVER
HOW OFTEN DO YOU HAVE SIX OR MORE DRINKS ON ONE OCCASION: 1

## 2023-02-24 ASSESSMENT — PATIENT HEALTH QUESTIONNAIRE - PHQ9
SUM OF ALL RESPONSES TO PHQ QUESTIONS 1-9: 0
SUM OF ALL RESPONSES TO PHQ9 QUESTIONS 1 & 2: 0
SUM OF ALL RESPONSES TO PHQ QUESTIONS 1-9: 0
SUM OF ALL RESPONSES TO PHQ QUESTIONS 1-9: 0
2. FEELING DOWN, DEPRESSED OR HOPELESS: 0
1. LITTLE INTEREST OR PLEASURE IN DOING THINGS: 0
SUM OF ALL RESPONSES TO PHQ QUESTIONS 1-9: 0

## 2023-03-02 ENCOUNTER — OFFICE VISIT (OUTPATIENT)
Dept: FAMILY MEDICINE CLINIC | Age: 78
End: 2023-03-02

## 2023-03-02 ENCOUNTER — HOSPITAL ENCOUNTER (OUTPATIENT)
Age: 78
Discharge: HOME | End: 2023-03-02
Payer: MEDICARE

## 2023-03-02 ENCOUNTER — HOSPITAL ENCOUNTER (OUTPATIENT)
Dept: GENERAL RADIOLOGY | Age: 78
Discharge: HOME | End: 2023-03-02
Payer: MEDICARE

## 2023-03-02 VITALS
BODY MASS INDEX: 29.55 KG/M2 | HEIGHT: 68 IN | HEART RATE: 81 BPM | DIASTOLIC BLOOD PRESSURE: 80 MMHG | OXYGEN SATURATION: 96 % | WEIGHT: 195 LBS | SYSTOLIC BLOOD PRESSURE: 122 MMHG

## 2023-03-02 DIAGNOSIS — I10 ESSENTIAL HYPERTENSION: ICD-10-CM

## 2023-03-02 DIAGNOSIS — R06.02 SOB (SHORTNESS OF BREATH): ICD-10-CM

## 2023-03-02 DIAGNOSIS — R53.83 FATIGUE, UNSPECIFIED TYPE: ICD-10-CM

## 2023-03-02 DIAGNOSIS — J02.9 SORE THROAT: Primary | ICD-10-CM

## 2023-03-02 DIAGNOSIS — J02.9 SORE THROAT: ICD-10-CM

## 2023-03-02 PROBLEM — I47.1 ATRIAL TACHYCARDIA (HCC): Status: RESOLVED | Noted: 2020-03-05 | Resolved: 2023-03-02

## 2023-03-02 PROBLEM — I48.92 ATRIAL FLUTTER (HCC): Status: RESOLVED | Noted: 2020-01-16 | Resolved: 2023-03-02

## 2023-03-02 PROBLEM — I47.19 ATRIAL TACHYCARDIA: Status: RESOLVED | Noted: 2020-03-05 | Resolved: 2023-03-02

## 2023-03-02 LAB
A/G RATIO: 1.7 (ref 1.1–2.2)
ALBUMIN SERPL-MCNC: 4.3 G/DL (ref 3.4–5)
ALP BLD-CCNC: 92 U/L (ref 40–129)
ALT SERPL-CCNC: 19 U/L (ref 10–40)
ANION GAP SERPL CALCULATED.3IONS-SCNC: 14 MMOL/L (ref 3–16)
AST SERPL-CCNC: 20 U/L (ref 15–37)
BASOPHILS ABSOLUTE: 0.1 K/UL (ref 0–0.2)
BASOPHILS RELATIVE PERCENT: 0.8 %
BILIRUB SERPL-MCNC: <0.2 MG/DL (ref 0–1)
BUN BLDV-MCNC: 44 MG/DL (ref 7–20)
CALCIUM SERPL-MCNC: 9.3 MG/DL (ref 8.3–10.6)
CHLORIDE BLD-SCNC: 103 MMOL/L (ref 99–110)
CHOLESTEROL, TOTAL: 193 MG/DL (ref 0–199)
CO2: 20 MMOL/L (ref 21–32)
CREAT SERPL-MCNC: 1.7 MG/DL (ref 0.8–1.3)
EOSINOPHILS ABSOLUTE: 0.2 K/UL (ref 0–0.6)
EOSINOPHILS RELATIVE PERCENT: 2.9 %
GFR SERPL CREATININE-BSD FRML MDRD: 41 ML/MIN/{1.73_M2}
GLUCOSE BLD-MCNC: 103 MG/DL (ref 70–99)
HCT VFR BLD CALC: 38.5 % (ref 40.5–52.5)
HDLC SERPL-MCNC: 47 MG/DL (ref 40–60)
HEMOGLOBIN: 12.8 G/DL (ref 13.5–17.5)
INFLUENZA A ANTIGEN, POC: NEGATIVE
INFLUENZA B ANTIGEN, POC: NEGATIVE
LDL CHOLESTEROL CALCULATED: 129 MG/DL
LOT EXPIRE DATE: NORMAL
LOT KIT NUMBER: NORMAL
LYMPHOCYTES ABSOLUTE: 2 K/UL (ref 1–5.1)
LYMPHOCYTES RELATIVE PERCENT: 25.8 %
MCH RBC QN AUTO: 30.4 PG (ref 26–34)
MCHC RBC AUTO-ENTMCNC: 33.2 G/DL (ref 31–36)
MCV RBC AUTO: 91.4 FL (ref 80–100)
MONOCYTES ABSOLUTE: 0.6 K/UL (ref 0–1.3)
MONOCYTES RELATIVE PERCENT: 7.9 %
NEUTROPHILS ABSOLUTE: 4.8 K/UL (ref 1.7–7.7)
NEUTROPHILS RELATIVE PERCENT: 62.6 %
PDW BLD-RTO: 13.4 % (ref 12.4–15.4)
PLATELET # BLD: 237 K/UL (ref 135–450)
PMV BLD AUTO: 9.2 FL (ref 5–10.5)
POTASSIUM SERPL-SCNC: 5.1 MMOL/L (ref 3.5–5.1)
RBC # BLD: 4.22 M/UL (ref 4.2–5.9)
S PYO AG THROAT QL: NORMAL
SARS-COV-2, POC: NORMAL
SODIUM BLD-SCNC: 137 MMOL/L (ref 136–145)
TOTAL PROTEIN: 6.9 G/DL (ref 6.4–8.2)
TRIGL SERPL-MCNC: 85 MG/DL (ref 0–150)
TSH REFLEX FT4: 2.17 UIU/ML (ref 0.27–4.2)
VALID INTERNAL CONTROL: NORMAL
VENDOR AND KIT NAME POC: NORMAL
VLDLC SERPL CALC-MCNC: 17 MG/DL
WBC # BLD: 7.7 K/UL (ref 4–11)

## 2023-03-02 PROCEDURE — 71046 X-RAY EXAM CHEST 2 VIEWS: CPT

## 2023-03-02 RX ORDER — LISINOPRIL 40 MG/1
TABLET ORAL
Qty: 90 TABLET | Refills: 3 | Status: SHIPPED | OUTPATIENT
Start: 2023-03-02

## 2023-03-02 ASSESSMENT — ENCOUNTER SYMPTOMS
SHORTNESS OF BREATH: 1
SORE THROAT: 1
WHEEZING: 0

## 2023-03-02 NOTE — PROGRESS NOTES
Patient: Ca Leon is a 69 y.o. male who presents today with the following Chief Complaint(s):  Chief Complaint   Patient presents with    Pharyngitis     Fatigue, sob, slight cough, x7 days         HPI  Here with c/o   Fatigue, SOB, sore throat been going on for 7 days. Had bad sore throat last Saturday and still a little sore. No chest pain, or heart palpitations. 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 other day because he was feeling so fatigued      Current Outpatient Medications   Medication Sig Dispense Refill    lisinopril (PRINIVIL;ZESTRIL) 40 MG tablet TAKE ONE TABLET BY MOUTH DAILY 90 tablet 3    Omega-3 Fatty Acids (OMEGA-3 FISH OIL PO) Take by mouth      zinc 50 MG TABS tablet Take 50 mg by mouth daily      Probiotic Product (PROBIOTIC DAILY PO) Take by mouth      COCONUT OIL PO Take by mouth      GARLIC PO Take 1 tablet by mouth 2 times daily      TURMERIC PO Take 1 tablet by mouth daily      B Complex Vitamins (VITAMIN-B COMPLEX) TABS Take 1 tablet by mouth daily      Coenzyme Q10 (COQ10 PO) Take 1 tablet by mouth 2 times daily      ALPHA LIPOIC ACID PO Take 1 tablet by mouth 2 times daily      ibuprofen (ADVIL;MOTRIN) 600 MG tablet Take 1 tablet by mouth every 6 hours as needed for Pain. 40 tablet 0    Vitamin D (CHOLECALCIFEROL) 1000 UNITS CAPS capsule Take 1,000 Units by mouth daily. No current facility-administered medications for this visit. Patient's past medical history, surgical history, family history, medications,  andallergies  were all reviewed and updated as appropriate today. Review of Systems   Constitutional:  Positive for fatigue. Negative for chills and fever. HENT:  Positive for sore throat. Negative for congestion and ear pain. Respiratory:  Positive for shortness of breath. Negative for wheezing. Cardiovascular:  Negative for chest pain and palpitations.    Neurological:  Positive for light-headedness (off and on- especially when he gets fatigued). Negative for headaches. Physical Exam  Vitals and nursing note reviewed. Constitutional:       Appearance: Normal appearance. He is well-developed. HENT:      Head: Normocephalic and atraumatic. Right Ear: Tympanic membrane and external ear normal.      Left Ear: Tympanic membrane and external ear normal.      Nose: Nose normal.      Mouth/Throat:      Pharynx: No oropharyngeal exudate or posterior oropharyngeal erythema. Eyes:      Conjunctiva/sclera: Conjunctivae normal.   Cardiovascular:      Rate and Rhythm: Normal rate and regular rhythm. Heart sounds: Normal heart sounds. No murmur heard. Pulmonary:      Effort: Pulmonary effort is normal. No respiratory distress. Breath sounds: Normal breath sounds. No wheezing or rales. Musculoskeletal:         General: Normal range of motion. Cervical back: Normal range of motion. Lymphadenopathy:      Cervical: No cervical adenopathy. Skin:     General: Skin is warm and dry. Neurological:      General: No focal deficit present. Mental Status: He is alert and oriented to person, place, and time. Deep Tendon Reflexes: Reflexes are normal and symmetric. Psychiatric:         Mood and Affect: Mood normal.         Behavior: Behavior normal.         Thought Content: Thought content normal.         Judgment: Judgment normal.     Vitals:    03/02/23 1359   BP: 122/80   Pulse: 81   SpO2: 96%       Assessment:  Encounter Diagnoses   Name Primary? Essential hypertension     Sore throat Yes    SOB (shortness of breath)     Fatigue, unspecified type        Plan:  1. Essential hypertension  BP stable today 122/80- continue lisinopril 40mg daily  - lisinopril (PRINIVIL;ZESTRIL) 40 MG tablet; TAKE ONE TABLET BY MOUTH DAILY  Dispense: 90 tablet; Refill: 3  - CBC with Auto Differential; Future  - Comprehensive Metabolic Panel; Future  - Lipid Panel; Future    2.  Sore throat    - POCT COVID-19 & Influenza A/B: negative COVID, negative influenza A/B  - POCT rapid strep A: negative strep  - MONONUCLEOSIS SCREEN; Future    3. SOB (shortness of breath)    - XR CHEST STANDARD (2 VW); Future  - EKG 12 Lead - Clinic Performed: normal, reviewed by Dr. Matias Arguelles     4. Fatigue, unspecified type    - TSH with Reflex to FT4; Future  - EKG 12 Lead - Clinic Performed: normal- reviewed by Dr. Geraldo Strauss; Future        No follow-ups on file.

## 2023-03-03 DIAGNOSIS — R79.89 ELEVATED SERUM CREATININE: Primary | ICD-10-CM

## 2023-03-03 DIAGNOSIS — R79.9 ELEVATED BUN: ICD-10-CM

## 2023-03-03 LAB — MONO TEST: NEGATIVE

## 2023-06-27 ENCOUNTER — PATIENT MESSAGE (OUTPATIENT)
Dept: FAMILY MEDICINE CLINIC | Age: 78
End: 2023-06-27

## 2024-01-26 DIAGNOSIS — I10 ESSENTIAL HYPERTENSION: ICD-10-CM

## 2024-01-26 RX ORDER — LISINOPRIL 40 MG/1
TABLET ORAL
Qty: 90 TABLET | Refills: 3 | Status: SHIPPED | OUTPATIENT
Start: 2024-01-26

## 2024-01-26 NOTE — TELEPHONE ENCOUNTER
----- Message from Rachel Waltervilma sent at 1/26/2024  9:40 AM EST -----  Subject: Refill Request    QUESTIONS  Name of Medication? lisinopril (PRINIVIL;ZESTRIL) 40 MG tablet  Patient-reported dosage and instructions? TAKE ONE TABLET BY MOUTH DAILY  How many days do you have left? 7  Preferred Pharmacy? [facility] [id number]  Pharmacy phone number (if available)? [phone]  ---------------------------------------------------------------------------  --------------  CALL BACK INFO  What is the best way for the office to contact you? OK to leave message on   voicemail  Preferred Call Back Phone Number? [phone]  ---------------------------------------------------------------------------  --------------  SCRIPT ANSWERS  Relationship to Patient? Self

## 2024-01-26 NOTE — TELEPHONE ENCOUNTER
Last Office Visit  -  3/2/23  Next Office Visit  -  3/11/24    Last Filled  -  3/2/23  Last UDS -    Contract -

## 2024-03-11 ENCOUNTER — OFFICE VISIT (OUTPATIENT)
Dept: FAMILY MEDICINE CLINIC | Age: 79
End: 2024-03-11
Payer: MEDICARE

## 2024-03-11 VITALS
DIASTOLIC BLOOD PRESSURE: 78 MMHG | HEART RATE: 78 BPM | BODY MASS INDEX: 30.16 KG/M2 | HEIGHT: 68 IN | SYSTOLIC BLOOD PRESSURE: 128 MMHG | OXYGEN SATURATION: 98 % | WEIGHT: 199 LBS

## 2024-03-11 DIAGNOSIS — Z00.00 MEDICARE ANNUAL WELLNESS VISIT, SUBSEQUENT: Primary | ICD-10-CM

## 2024-03-11 PROCEDURE — 3074F SYST BP LT 130 MM HG: CPT | Performed by: NURSE PRACTITIONER

## 2024-03-11 PROCEDURE — 36415 COLL VENOUS BLD VENIPUNCTURE: CPT | Performed by: NURSE PRACTITIONER

## 2024-03-11 PROCEDURE — 3078F DIAST BP <80 MM HG: CPT | Performed by: NURSE PRACTITIONER

## 2024-03-11 PROCEDURE — 1123F ACP DISCUSS/DSCN MKR DOCD: CPT | Performed by: NURSE PRACTITIONER

## 2024-03-11 PROCEDURE — G0439 PPPS, SUBSEQ VISIT: HCPCS | Performed by: NURSE PRACTITIONER

## 2024-03-11 ASSESSMENT — PATIENT HEALTH QUESTIONNAIRE - PHQ9
SUM OF ALL RESPONSES TO PHQ QUESTIONS 1-9: 0
1. LITTLE INTEREST OR PLEASURE IN DOING THINGS: 0
2. FEELING DOWN, DEPRESSED OR HOPELESS: 0
SUM OF ALL RESPONSES TO PHQ QUESTIONS 1-9: 0
SUM OF ALL RESPONSES TO PHQ QUESTIONS 1-9: 0
SUM OF ALL RESPONSES TO PHQ9 QUESTIONS 1 & 2: 0
SUM OF ALL RESPONSES TO PHQ QUESTIONS 1-9: 0

## 2024-03-11 ASSESSMENT — LIFESTYLE VARIABLES
HOW MANY STANDARD DRINKS CONTAINING ALCOHOL DO YOU HAVE ON A TYPICAL DAY: PATIENT DOES NOT DRINK
HOW OFTEN DO YOU HAVE A DRINK CONTAINING ALCOHOL: NEVER

## 2024-03-11 NOTE — PROGRESS NOTES
Medicare Annual Wellness Visit    Leroy Douglass is here for Medicare AWV    Assessment & Plan   Medicare annual wellness visit, subsequent  -     CBC with Auto Differential  -     Comprehensive Metabolic Panel  -     Hemoglobin A1C  -     Lipid Panel    Recommendations for Preventive Services Due: see orders and patient instructions/AVS.  Recommended screening schedule for the next 5-10 years is provided to the patient in written form: see Patient Instructions/AVS.     No follow-ups on file.     Subjective   No complaints    Patient's complete Health Risk Assessment and screening values have been reviewed and are found in Flowsheets. The following problems were reviewed today and where indicated follow up appointments were made and/or referrals ordered.    Positive Risk Factor Screenings with Interventions:                Activity, Diet, and Weight:  On average, how many days per week do you engage in moderate to strenuous exercise (like a brisk walk)?: 7 days  On average, how many minutes do you engage in exercise at this level?: 150+ min    Do you eat balanced/healthy meals regularly?: Yes    Body mass index is 30.26 kg/m². (!) Abnormal    Obesity Interventions:  Patient declines any further evaluation or treatment            Dentist Screen:  Have you seen the dentist within the past year?: (!) No    Intervention:  Advised to schedule with their dentist     Vision Screen:  Do you have difficulty driving, watching TV, or doing any of your daily activities because of your eyesight?: No  Have you had an eye exam within the past year?: (!) No  No results found.    Interventions:    Had cataract surgery, wears readers if needed                     Objective   Vitals:    03/11/24 1612   BP: 128/78   Pulse: 78   SpO2: 98%   Weight: 90.3 kg (199 lb)   Height: 1.727 m (5' 8\")      Body mass index is 30.26 kg/m².      General Appearance: alert and oriented to person, place and time, well-developed and well-nourished, in

## 2024-03-11 NOTE — PATIENT INSTRUCTIONS
information.      Personalized Preventive Plan for Leroy Douglass - 3/11/2024  Medicare offers a range of preventive health benefits. Some of the tests and screenings are paid in full while other may be subject to a deductible, co-insurance, and/or copay.    Some of these benefits include a comprehensive review of your medical history including lifestyle, illnesses that may run in your family, and various assessments and screenings as appropriate.    After reviewing your medical record and screening and assessments performed today your provider may have ordered immunizations, labs, imaging, and/or referrals for you.  A list of these orders (if applicable) as well as your Preventive Care list are included within your After Visit Summary for your review.    Other Preventive Recommendations:    A preventive eye exam performed by an eye specialist is recommended every 1-2 years to screen for glaucoma; cataracts, macular degeneration, and other eye disorders.  A preventive dental visit is recommended every 6 months.  Try to get at least 150 minutes of exercise per week or 10,000 steps per day on a pedometer .  Order or download the FREE \"Exercise & Physical Activity: Your Everyday Guide\" from [employer]. Call 1-528.833.8161 or search [employer] online.  You need 5977-4178 mg of calcium and 8683-6179 IU of vitamin D per day. It is possible to meet your calcium requirement with diet alone, but a vitamin D supplement is usually necessary to meet this goal.  When exposed to the sun, use a sunscreen that protects against both UVA and UVB radiation with an SPF of 30 or greater. Reapply every 2 to 3 hours or after sweating, drying off with a towel, or swimming.  Always wear a seat belt when traveling in a car. Always wear a helmet when riding a bicycle or motorcycle.

## 2024-03-12 LAB
ALBUMIN SERPL-MCNC: 4.7 G/DL (ref 3.4–5)
ALBUMIN/GLOB SERPL: 2 {RATIO} (ref 1.1–2.2)
ALP SERPL-CCNC: 93 U/L (ref 40–129)
ALT SERPL-CCNC: 19 U/L (ref 10–40)
ANION GAP SERPL CALCULATED.3IONS-SCNC: 11 MMOL/L (ref 3–16)
AST SERPL-CCNC: 18 U/L (ref 15–37)
BASOPHILS # BLD: 0.1 K/UL (ref 0–0.2)
BASOPHILS NFR BLD: 1.1 %
BILIRUB SERPL-MCNC: 0.3 MG/DL (ref 0–1)
BUN SERPL-MCNC: 28 MG/DL (ref 7–20)
CALCIUM SERPL-MCNC: 10 MG/DL (ref 8.3–10.6)
CHLORIDE SERPL-SCNC: 103 MMOL/L (ref 99–110)
CHOLEST SERPL-MCNC: 211 MG/DL (ref 0–199)
CO2 SERPL-SCNC: 26 MMOL/L (ref 21–32)
CREAT SERPL-MCNC: 1.2 MG/DL (ref 0.8–1.3)
DEPRECATED RDW RBC AUTO: 13.7 % (ref 12.4–15.4)
EOSINOPHIL # BLD: 0.2 K/UL (ref 0–0.6)
EOSINOPHIL NFR BLD: 3.3 %
EST. AVERAGE GLUCOSE BLD GHB EST-MCNC: 105.4 MG/DL
GFR SERPLBLD CREATININE-BSD FMLA CKD-EPI: >60 ML/MIN/{1.73_M2}
GLUCOSE SERPL-MCNC: 104 MG/DL (ref 70–99)
HBA1C MFR BLD: 5.3 %
HCT VFR BLD AUTO: 42.5 % (ref 40.5–52.5)
HDLC SERPL-MCNC: 55 MG/DL (ref 40–60)
HGB BLD-MCNC: 14.5 G/DL (ref 13.5–17.5)
LDLC SERPL CALC-MCNC: 133 MG/DL
LYMPHOCYTES # BLD: 1.6 K/UL (ref 1–5.1)
LYMPHOCYTES NFR BLD: 21.3 %
MCH RBC QN AUTO: 31 PG (ref 26–34)
MCHC RBC AUTO-ENTMCNC: 34.2 G/DL (ref 31–36)
MCV RBC AUTO: 90.7 FL (ref 80–100)
MONOCYTES # BLD: 0.6 K/UL (ref 0–1.3)
MONOCYTES NFR BLD: 8.6 %
NEUTROPHILS # BLD: 4.9 K/UL (ref 1.7–7.7)
NEUTROPHILS NFR BLD: 65.7 %
PLATELET # BLD AUTO: 244 K/UL (ref 135–450)
PMV BLD AUTO: 9.4 FL (ref 5–10.5)
POTASSIUM SERPL-SCNC: 5 MMOL/L (ref 3.5–5.1)
PROT SERPL-MCNC: 7 G/DL (ref 6.4–8.2)
RBC # BLD AUTO: 4.68 M/UL (ref 4.2–5.9)
SODIUM SERPL-SCNC: 140 MMOL/L (ref 136–145)
TRIGL SERPL-MCNC: 115 MG/DL (ref 0–150)
VLDLC SERPL CALC-MCNC: 23 MG/DL
WBC # BLD AUTO: 7.5 K/UL (ref 4–11)

## 2025-02-13 DIAGNOSIS — I10 ESSENTIAL HYPERTENSION: ICD-10-CM

## 2025-02-13 RX ORDER — LISINOPRIL 40 MG/1
TABLET ORAL
Qty: 90 TABLET | Refills: 3 | Status: SHIPPED | OUTPATIENT
Start: 2025-02-13

## 2025-02-13 NOTE — TELEPHONE ENCOUNTER
Patient is requesting a rx for lisinopril (PRINIVIL;ZESTRIL) 40 MG tablet .    Last seen 3/11/24  Next visit 3/17/25    Deanna

## 2025-02-13 NOTE — TELEPHONE ENCOUNTER
Last Office Visit  -  3/11/24  Next Office Visit  -  3/17/25    Last Filled  -  1/26/24  Last UDS -    Contract -

## 2025-03-09 SDOH — HEALTH STABILITY: PHYSICAL HEALTH: ON AVERAGE, HOW MANY DAYS PER WEEK DO YOU ENGAGE IN MODERATE TO STRENUOUS EXERCISE (LIKE A BRISK WALK)?: 7 DAYS

## 2025-03-09 SDOH — HEALTH STABILITY: PHYSICAL HEALTH: ON AVERAGE, HOW MANY MINUTES DO YOU ENGAGE IN EXERCISE AT THIS LEVEL?: 60 MIN

## 2025-03-09 ASSESSMENT — PATIENT HEALTH QUESTIONNAIRE - PHQ9
SUM OF ALL RESPONSES TO PHQ QUESTIONS 1-9: 0
SUM OF ALL RESPONSES TO PHQ QUESTIONS 1-9: 0
2. FEELING DOWN, DEPRESSED OR HOPELESS: NOT AT ALL
SUM OF ALL RESPONSES TO PHQ QUESTIONS 1-9: 0
1. LITTLE INTEREST OR PLEASURE IN DOING THINGS: NOT AT ALL
SUM OF ALL RESPONSES TO PHQ QUESTIONS 1-9: 0

## 2025-03-09 ASSESSMENT — LIFESTYLE VARIABLES
HOW MANY STANDARD DRINKS CONTAINING ALCOHOL DO YOU HAVE ON A TYPICAL DAY: 0
HOW MANY STANDARD DRINKS CONTAINING ALCOHOL DO YOU HAVE ON A TYPICAL DAY: PATIENT DOES NOT DRINK
HOW OFTEN DO YOU HAVE A DRINK CONTAINING ALCOHOL: 1
HOW OFTEN DO YOU HAVE SIX OR MORE DRINKS ON ONE OCCASION: 1
HOW OFTEN DO YOU HAVE A DRINK CONTAINING ALCOHOL: NEVER

## 2025-03-17 ENCOUNTER — OFFICE VISIT (OUTPATIENT)
Dept: FAMILY MEDICINE CLINIC | Age: 80
End: 2025-03-17
Payer: MEDICARE

## 2025-03-17 VITALS
HEART RATE: 65 BPM | BODY MASS INDEX: 30.92 KG/M2 | SYSTOLIC BLOOD PRESSURE: 124 MMHG | DIASTOLIC BLOOD PRESSURE: 70 MMHG | WEIGHT: 204 LBS | HEIGHT: 68 IN | OXYGEN SATURATION: 93 %

## 2025-03-17 DIAGNOSIS — I10 PRIMARY HYPERTENSION: ICD-10-CM

## 2025-03-17 DIAGNOSIS — E78.00 HIGH CHOLESTEROL: ICD-10-CM

## 2025-03-17 DIAGNOSIS — Z00.00 MEDICARE ANNUAL WELLNESS VISIT, SUBSEQUENT: Primary | ICD-10-CM

## 2025-03-17 LAB
ALBUMIN SERPL-MCNC: 4.7 G/DL (ref 3.4–5)
ALBUMIN/GLOB SERPL: 2.1 {RATIO} (ref 1.1–2.2)
ALP SERPL-CCNC: 92 U/L (ref 40–129)
ALT SERPL-CCNC: 37 U/L (ref 10–40)
ANION GAP SERPL CALCULATED.3IONS-SCNC: 10 MMOL/L (ref 3–16)
AST SERPL-CCNC: 25 U/L (ref 15–37)
BASOPHILS # BLD: 0.1 K/UL (ref 0–0.2)
BASOPHILS NFR BLD: 1 %
BILIRUB SERPL-MCNC: 0.3 MG/DL (ref 0–1)
BUN SERPL-MCNC: 27 MG/DL (ref 7–20)
CALCIUM SERPL-MCNC: 9.5 MG/DL (ref 8.3–10.6)
CHLORIDE SERPL-SCNC: 105 MMOL/L (ref 99–110)
CHOLEST SERPL-MCNC: 220 MG/DL (ref 0–199)
CO2 SERPL-SCNC: 22 MMOL/L (ref 21–32)
CREAT SERPL-MCNC: 1.2 MG/DL (ref 0.8–1.3)
DEPRECATED RDW RBC AUTO: 14.3 % (ref 12.4–15.4)
EOSINOPHIL # BLD: 0.3 K/UL (ref 0–0.6)
EOSINOPHIL NFR BLD: 3.6 %
GFR SERPLBLD CREATININE-BSD FMLA CKD-EPI: 62 ML/MIN/{1.73_M2}
GLUCOSE SERPL-MCNC: 109 MG/DL (ref 70–99)
HCT VFR BLD AUTO: 41.2 % (ref 40.5–52.5)
HDLC SERPL-MCNC: 68 MG/DL (ref 40–60)
HGB BLD-MCNC: 13.8 G/DL (ref 13.5–17.5)
LDLC SERPL CALC-MCNC: 133 MG/DL
LYMPHOCYTES # BLD: 1.7 K/UL (ref 1–5.1)
LYMPHOCYTES NFR BLD: 23.4 %
MCH RBC QN AUTO: 31.4 PG (ref 26–34)
MCHC RBC AUTO-ENTMCNC: 33.5 G/DL (ref 31–36)
MCV RBC AUTO: 93.7 FL (ref 80–100)
MONOCYTES # BLD: 0.5 K/UL (ref 0–1.3)
MONOCYTES NFR BLD: 6.8 %
NEUTROPHILS # BLD: 4.9 K/UL (ref 1.7–7.7)
NEUTROPHILS NFR BLD: 65.2 %
PLATELET # BLD AUTO: 222 K/UL (ref 135–450)
PMV BLD AUTO: 9 FL (ref 5–10.5)
POTASSIUM SERPL-SCNC: 4.6 MMOL/L (ref 3.5–5.1)
PROT SERPL-MCNC: 6.9 G/DL (ref 6.4–8.2)
RBC # BLD AUTO: 4.39 M/UL (ref 4.2–5.9)
SODIUM SERPL-SCNC: 137 MMOL/L (ref 136–145)
TRIGL SERPL-MCNC: 93 MG/DL (ref 0–150)
VLDLC SERPL CALC-MCNC: 19 MG/DL
WBC # BLD AUTO: 7.5 K/UL (ref 4–11)

## 2025-03-17 PROCEDURE — 1159F MED LIST DOCD IN RCRD: CPT | Performed by: NURSE PRACTITIONER

## 2025-03-17 PROCEDURE — G0439 PPPS, SUBSEQ VISIT: HCPCS | Performed by: NURSE PRACTITIONER

## 2025-03-17 PROCEDURE — 1160F RVW MEDS BY RX/DR IN RCRD: CPT | Performed by: NURSE PRACTITIONER

## 2025-03-17 PROCEDURE — 3078F DIAST BP <80 MM HG: CPT | Performed by: NURSE PRACTITIONER

## 2025-03-17 PROCEDURE — 1123F ACP DISCUSS/DSCN MKR DOCD: CPT | Performed by: NURSE PRACTITIONER

## 2025-03-17 PROCEDURE — 3074F SYST BP LT 130 MM HG: CPT | Performed by: NURSE PRACTITIONER

## 2025-03-17 SDOH — ECONOMIC STABILITY: FOOD INSECURITY: WITHIN THE PAST 12 MONTHS, YOU WORRIED THAT YOUR FOOD WOULD RUN OUT BEFORE YOU GOT MONEY TO BUY MORE.: NEVER TRUE

## 2025-03-17 SDOH — ECONOMIC STABILITY: FOOD INSECURITY: WITHIN THE PAST 12 MONTHS, THE FOOD YOU BOUGHT JUST DIDN'T LAST AND YOU DIDN'T HAVE MONEY TO GET MORE.: NEVER TRUE

## 2025-03-17 NOTE — PATIENT INSTRUCTIONS
vision is often tested as part of a routine exam. You may also have this test when you apply for a job where recognizing different colors is important, such as , electronics, or the .  How are vision tests done?  Visual acuity test   You cover one eye at a time.  You read aloud from a wall chart across the room.  You read aloud from a small card that you hold in your hand.  Refraction   You look into a special device.  The device puts lenses of different strengths in front of each eye to see how strong your glasses or contact lenses need to be.  Visual field tests   Your doctor may have you look through special machines.  Or your doctor may simply have you stare straight ahead while they move a finger into and out of your field of vision.  Color vision test   You look at pieces of printed test patterns in various colors. You say what number or symbol you see.  Your doctor may have you trace the number or symbol using a pointer.  How do these tests feel?  There is very little chance of having a problem from this test. If dilating drops are used for a vision test, they may make the eyes sting and cause a medicine taste in the mouth.  Follow-up care is a key part of your treatment and safety. Be sure to make and go to all appointments, and call your doctor if you are having problems. It's also a good idea to know your test results and keep a list of the medicines you take.  Where can you learn more?  Go to https://www.[employer].net/patientEd and enter G551 to learn more about \"Learning About Vision Tests.\"  Current as of: July 31, 2024  Content Version: 14.4  © 2024-2025 [employer].   Care instructions adapted under license by [employer]. If you have questions about a medical condition or this instruction, always ask your healthcare professional. [employer] disclaims any warranty or liability for your use of this information.         Starting a Weight-Loss Plan: Care

## 2025-03-17 NOTE — PROGRESS NOTES
Medicare Annual Wellness Visit    Leroy Douglass is here for Medicare AWV    Assessment & Plan   Medicare annual wellness visit, subsequent       No follow-ups on file.     Subjective   The following acute and/or chronic problems were also addressed today:  No complaints- working four 5 hr shifts at [employer] a week       Patient's complete Health Risk Assessment and screening values have been reviewed and are found in Flowsheets. The following problems were reviewed today and where indicated follow up appointments were made and/or referrals ordered.    Positive Risk Factor Screenings with Interventions:                Abnormal BMI (obese):  Body mass index is 31.02 kg/m². (!) Abnormal  Interventions:  Gets a lot of steps at work- working at [employer]         Dentist Screen:  Have you seen the dentist within the past year?: (!) (Patient-Rptd) No    Intervention:  Advised to schedule with their dentist     Vision Screen:  Do you have difficulty driving, watching TV, or doing any of your daily activities because of your eyesight?: (Patient-Rptd) No  Have you had an eye exam within the past year?: (!) (Patient-Rptd) No  Interventions:   Patient declines any further evaluation or treatment                    Objective   Vitals:    03/17/25 0951   BP: 124/70   Pulse: 65   SpO2: 93%   Weight: 92.5 kg (204 lb)   Height: 1.727 m (5' 8\")      Body mass index is 31.02 kg/m².      General Appearance: alert and oriented to person, place and time, well-developed and well-nourished, in no acute distress  ENT: tympanic membrane, external ear and ear canal normal bilaterally, oropharynx clear and moist with normal mucous membranes  Pulmonary/Chest: clear to auscultation bilaterally- no wheezes, rales or rhonchi, normal air movement, no respiratory distress  Cardiovascular: normal rate, regular rhythm, and + murmur  Abdomen: soft, non-tender, non-distended, normal bowel sounds, no masses or organomegaly  Neurologic: gait and

## 2025-03-18 ENCOUNTER — RESULTS FOLLOW-UP (OUTPATIENT)
Dept: FAMILY MEDICINE CLINIC | Age: 80
End: 2025-03-18

## 2025-04-13 ENCOUNTER — TELEPHONE (OUTPATIENT)
Dept: FAMILY MEDICINE CLINIC | Age: 80
End: 2025-04-13

## 2025-04-13 ENCOUNTER — ANESTHESIA (OUTPATIENT)
Dept: ENDOSCOPY | Age: 80
End: 2025-04-13
Payer: MEDICARE

## 2025-04-13 ENCOUNTER — HOSPITAL ENCOUNTER (INPATIENT)
Age: 80
LOS: 2 days | Discharge: HOME | DRG: 378 | End: 2025-04-15
Attending: STUDENT IN AN ORGANIZED HEALTH CARE EDUCATION/TRAINING PROGRAM | Admitting: INTERNAL MEDICINE
Payer: MEDICARE

## 2025-04-13 ENCOUNTER — ANESTHESIA EVENT (OUTPATIENT)
Dept: ENDOSCOPY | Age: 80
End: 2025-04-13
Payer: MEDICARE

## 2025-04-13 DIAGNOSIS — D64.9 NORMOCYTIC ANEMIA: ICD-10-CM

## 2025-04-13 DIAGNOSIS — R10.13 EPIGASTRIC PAIN: ICD-10-CM

## 2025-04-13 DIAGNOSIS — K92.0 GASTROINTESTINAL HEMORRHAGE WITH HEMATEMESIS: ICD-10-CM

## 2025-04-13 DIAGNOSIS — K92.2 GASTROINTESTINAL HEMORRHAGE, UNSPECIFIED GASTROINTESTINAL HEMORRHAGE TYPE: Primary | ICD-10-CM

## 2025-04-13 DIAGNOSIS — N17.9 AKI (ACUTE KIDNEY INJURY): ICD-10-CM

## 2025-04-13 LAB
ABO/RH: NORMAL
ALBUMIN SERPL-MCNC: 3.9 G/DL (ref 3.4–5)
ALBUMIN/GLOB SERPL: 1.1 {RATIO} (ref 1.1–2.2)
ALP SERPL-CCNC: 94 U/L (ref 40–129)
ALT SERPL-CCNC: 20 U/L (ref 10–40)
ANION GAP SERPL CALCULATED.3IONS-SCNC: 19 MMOL/L (ref 3–16)
ANTIBODY SCREEN: NORMAL
APTT BLD: 30 SEC (ref 22.1–36.4)
AST SERPL-CCNC: 15 U/L (ref 15–37)
BASOPHILS # BLD: 0.1 K/UL (ref 0–0.2)
BASOPHILS NFR BLD: 0.7 %
BILIRUB SERPL-MCNC: 0.3 MG/DL (ref 0–1)
BUN SERPL-MCNC: 65 MG/DL (ref 7–20)
CALCIUM SERPL-MCNC: 8.9 MG/DL (ref 8.3–10.6)
CHLORIDE SERPL-SCNC: 100 MMOL/L (ref 99–110)
CO2 SERPL-SCNC: 19 MMOL/L (ref 21–32)
CREAT SERPL-MCNC: 4 MG/DL (ref 0.8–1.3)
DEPRECATED RDW RBC AUTO: 14.1 % (ref 12.4–15.4)
EKG ATRIAL RATE: 90 BPM
EKG DIAGNOSIS: NORMAL
EKG P AXIS: 56 DEGREES
EKG P-R INTERVAL: 152 MS
EKG Q-T INTERVAL: 358 MS
EKG QRS DURATION: 102 MS
EKG QTC CALCULATION (BAZETT): 437 MS
EKG R AXIS: 38 DEGREES
EKG T AXIS: 33 DEGREES
EKG VENTRICULAR RATE: 90 BPM
EOSINOPHIL # BLD: 0.1 K/UL (ref 0–0.6)
EOSINOPHIL NFR BLD: 0.5 %
ETHANOLAMINE SERPL-MCNC: NORMAL MG/DL (ref 0–0.08)
FLUAV RNA RESP QL NAA+PROBE: NOT DETECTED
FLUBV RNA RESP QL NAA+PROBE: NOT DETECTED
GFR SERPLBLD CREATININE-BSD FMLA CKD-EPI: 15 ML/MIN/{1.73_M2}
GLUCOSE SERPL-MCNC: 107 MG/DL (ref 70–99)
HCT VFR BLD AUTO: 35.4 % (ref 40.5–52.5)
HCT VFR BLD AUTO: 36 % (ref 40.5–52.5)
HCT VFR BLD AUTO: 40.9 % (ref 40.5–52.5)
HGB BLD-MCNC: 12.2 G/DL (ref 13.5–17.5)
HGB BLD-MCNC: 12.4 G/DL (ref 13.5–17.5)
HGB BLD-MCNC: 14.1 G/DL (ref 13.5–17.5)
INR PPP: 1.01 (ref 0.85–1.15)
LACTATE BLDV-SCNC: 0.7 MMOL/L (ref 0.4–1.9)
LACTATE BLDV-SCNC: 1.5 MMOL/L (ref 0.4–1.9)
LIPASE SERPL-CCNC: 32 U/L (ref 13–60)
LYMPHOCYTES # BLD: 1.5 K/UL (ref 1–5.1)
LYMPHOCYTES NFR BLD: 13.3 %
MCH RBC QN AUTO: 31.1 PG (ref 26–34)
MCHC RBC AUTO-ENTMCNC: 34.5 G/DL (ref 31–36)
MCV RBC AUTO: 90.1 FL (ref 80–100)
MONOCYTES # BLD: 0.8 K/UL (ref 0–1.3)
MONOCYTES NFR BLD: 7.2 %
NEUTROPHILS # BLD: 8.6 K/UL (ref 1.7–7.7)
NEUTROPHILS NFR BLD: 78.3 %
NT-PROBNP SERPL-MCNC: 220 PG/ML (ref 0–449)
PLATELET # BLD AUTO: 335 K/UL (ref 135–450)
PMV BLD AUTO: 8.1 FL (ref 5–10.5)
POTASSIUM SERPL-SCNC: 4.2 MMOL/L (ref 3.5–5.1)
PROCALCITONIN SERPL IA-MCNC: 0.3 NG/ML (ref 0–0.15)
PROT SERPL-MCNC: 7.4 G/DL (ref 6.4–8.2)
PROTHROMBIN TIME: 13.5 SEC (ref 11.9–14.9)
RBC # BLD AUTO: 4.54 M/UL (ref 4.2–5.9)
SARS-COV-2 RNA RESP QL NAA+PROBE: NOT DETECTED
SODIUM SERPL-SCNC: 138 MMOL/L (ref 136–145)
TROPONIN, HIGH SENSITIVITY: 46 NG/L (ref 0–22)
TROPONIN, HIGH SENSITIVITY: 56 NG/L (ref 0–22)
WBC # BLD AUTO: 11 K/UL (ref 4–11)

## 2025-04-13 PROCEDURE — 83605 ASSAY OF LACTIC ACID: CPT

## 2025-04-13 PROCEDURE — 85610 PROTHROMBIN TIME: CPT

## 2025-04-13 PROCEDURE — 3700000001 HC ADD 15 MINUTES (ANESTHESIA): Performed by: INTERNAL MEDICINE

## 2025-04-13 PROCEDURE — 2709999900 HC NON-CHARGEABLE SUPPLY: Performed by: INTERNAL MEDICINE

## 2025-04-13 PROCEDURE — 6360000002 HC RX W HCPCS: Performed by: STUDENT IN AN ORGANIZED HEALTH CARE EDUCATION/TRAINING PROGRAM

## 2025-04-13 PROCEDURE — 85025 COMPLETE CBC W/AUTO DIFF WBC: CPT

## 2025-04-13 PROCEDURE — 93005 ELECTROCARDIOGRAM TRACING: CPT | Performed by: STUDENT IN AN ORGANIZED HEALTH CARE EDUCATION/TRAINING PROGRAM

## 2025-04-13 PROCEDURE — 96374 THER/PROPH/DIAG INJ IV PUSH: CPT

## 2025-04-13 PROCEDURE — 85014 HEMATOCRIT: CPT

## 2025-04-13 PROCEDURE — 0DB68ZX EXCISION OF STOMACH, VIA NATURAL OR ARTIFICIAL OPENING ENDOSCOPIC, DIAGNOSTIC: ICD-10-PCS | Performed by: INTERNAL MEDICINE

## 2025-04-13 PROCEDURE — 83880 ASSAY OF NATRIURETIC PEPTIDE: CPT

## 2025-04-13 PROCEDURE — 85018 HEMOGLOBIN: CPT

## 2025-04-13 PROCEDURE — 86850 RBC ANTIBODY SCREEN: CPT

## 2025-04-13 PROCEDURE — 87636 SARSCOV2 & INF A&B AMP PRB: CPT

## 2025-04-13 PROCEDURE — 82077 ASSAY SPEC XCP UR&BREATH IA: CPT

## 2025-04-13 PROCEDURE — 2500000003 HC RX 250 WO HCPCS

## 2025-04-13 PROCEDURE — 2580000003 HC RX 258: Performed by: ANESTHESIOLOGY

## 2025-04-13 PROCEDURE — 3609012400 HC EGD TRANSORAL BIOPSY SINGLE/MULTIPLE: Performed by: INTERNAL MEDICINE

## 2025-04-13 PROCEDURE — 3700000000 HC ANESTHESIA ATTENDED CARE: Performed by: INTERNAL MEDICINE

## 2025-04-13 PROCEDURE — 36415 COLL VENOUS BLD VENIPUNCTURE: CPT

## 2025-04-13 PROCEDURE — 99285 EMERGENCY DEPT VISIT HI MDM: CPT

## 2025-04-13 PROCEDURE — 88305 TISSUE EXAM BY PATHOLOGIST: CPT

## 2025-04-13 PROCEDURE — 83690 ASSAY OF LIPASE: CPT

## 2025-04-13 PROCEDURE — 86900 BLOOD TYPING SEROLOGIC ABO: CPT

## 2025-04-13 PROCEDURE — 85730 THROMBOPLASTIN TIME PARTIAL: CPT

## 2025-04-13 PROCEDURE — 93010 ELECTROCARDIOGRAM REPORT: CPT | Performed by: INTERNAL MEDICINE

## 2025-04-13 PROCEDURE — 2060000000 HC ICU INTERMEDIATE R&B

## 2025-04-13 PROCEDURE — 2580000003 HC RX 258

## 2025-04-13 PROCEDURE — 2580000003 HC RX 258: Performed by: STUDENT IN AN ORGANIZED HEALTH CARE EDUCATION/TRAINING PROGRAM

## 2025-04-13 PROCEDURE — 80053 COMPREHEN METABOLIC PANEL: CPT

## 2025-04-13 PROCEDURE — 6360000002 HC RX W HCPCS: Performed by: ANESTHESIOLOGY

## 2025-04-13 PROCEDURE — 84145 PROCALCITONIN (PCT): CPT

## 2025-04-13 PROCEDURE — 99223 1ST HOSP IP/OBS HIGH 75: CPT

## 2025-04-13 PROCEDURE — 6360000002 HC RX W HCPCS

## 2025-04-13 PROCEDURE — 84484 ASSAY OF TROPONIN QUANT: CPT

## 2025-04-13 PROCEDURE — 86901 BLOOD TYPING SEROLOGIC RH(D): CPT

## 2025-04-13 RX ORDER — SODIUM CHLORIDE 9 MG/ML
INJECTION, SOLUTION INTRAVENOUS PRN
Status: DISCONTINUED | OUTPATIENT
Start: 2025-04-13 | End: 2025-04-15 | Stop reason: HOSPADM

## 2025-04-13 RX ORDER — OXYCODONE HYDROCHLORIDE 5 MG/1
5 TABLET ORAL PRN
Refills: 0 | Status: CANCELLED | OUTPATIENT
Start: 2025-04-13 | End: 2025-04-13

## 2025-04-13 RX ORDER — ONDANSETRON 2 MG/ML
4 INJECTION INTRAMUSCULAR; INTRAVENOUS EVERY 6 HOURS PRN
Status: DISCONTINUED | OUTPATIENT
Start: 2025-04-13 | End: 2025-04-15 | Stop reason: HOSPADM

## 2025-04-13 RX ORDER — ONDANSETRON 2 MG/ML
4 INJECTION INTRAMUSCULAR; INTRAVENOUS
Status: CANCELLED | OUTPATIENT
Start: 2025-04-13

## 2025-04-13 RX ORDER — SODIUM CHLORIDE 0.9 % (FLUSH) 0.9 %
5-40 SYRINGE (ML) INJECTION EVERY 12 HOURS SCHEDULED
Status: DISCONTINUED | OUTPATIENT
Start: 2025-04-13 | End: 2025-04-15 | Stop reason: HOSPADM

## 2025-04-13 RX ORDER — ACETAMINOPHEN 325 MG/1
650 TABLET ORAL EVERY 6 HOURS PRN
Status: DISCONTINUED | OUTPATIENT
Start: 2025-04-13 | End: 2025-04-15 | Stop reason: HOSPADM

## 2025-04-13 RX ORDER — SODIUM CHLORIDE 0.9 % (FLUSH) 0.9 %
5-40 SYRINGE (ML) INJECTION PRN
Status: DISCONTINUED | OUTPATIENT
Start: 2025-04-13 | End: 2025-04-15 | Stop reason: HOSPADM

## 2025-04-13 RX ORDER — ONDANSETRON 4 MG/1
4 TABLET, ORALLY DISINTEGRATING ORAL EVERY 8 HOURS PRN
Status: DISCONTINUED | OUTPATIENT
Start: 2025-04-13 | End: 2025-04-15 | Stop reason: HOSPADM

## 2025-04-13 RX ORDER — 0.9 % SODIUM CHLORIDE 0.9 %
1000 INTRAVENOUS SOLUTION INTRAVENOUS ONCE
Status: COMPLETED | OUTPATIENT
Start: 2025-04-13 | End: 2025-04-13

## 2025-04-13 RX ORDER — SUCCINYLCHOLINE CHLORIDE 20 MG/ML
INJECTION INTRAMUSCULAR; INTRAVENOUS
Status: DISCONTINUED | OUTPATIENT
Start: 2025-04-13 | End: 2025-04-13 | Stop reason: SDUPTHER

## 2025-04-13 RX ORDER — PHENYLEPHRINE HYDROCHLORIDE 10 MG/ML
INJECTION INTRAVENOUS
Status: DISCONTINUED | OUTPATIENT
Start: 2025-04-13 | End: 2025-04-13 | Stop reason: SDUPTHER

## 2025-04-13 RX ORDER — LABETALOL HYDROCHLORIDE 5 MG/ML
10 INJECTION, SOLUTION INTRAVENOUS
Status: CANCELLED | OUTPATIENT
Start: 2025-04-13

## 2025-04-13 RX ORDER — SODIUM CHLORIDE 9 MG/ML
INJECTION, SOLUTION INTRAVENOUS PRN
Status: CANCELLED | OUTPATIENT
Start: 2025-04-13

## 2025-04-13 RX ORDER — SODIUM CHLORIDE 0.9 % (FLUSH) 0.9 %
5-40 SYRINGE (ML) INJECTION PRN
Status: CANCELLED | OUTPATIENT
Start: 2025-04-13

## 2025-04-13 RX ORDER — LIDOCAINE HYDROCHLORIDE 20 MG/ML
INJECTION, SOLUTION INFILTRATION; PERINEURAL
Status: DISCONTINUED | OUTPATIENT
Start: 2025-04-13 | End: 2025-04-13 | Stop reason: SDUPTHER

## 2025-04-13 RX ORDER — SODIUM CHLORIDE 0.9 % (FLUSH) 0.9 %
5-40 SYRINGE (ML) INJECTION EVERY 12 HOURS SCHEDULED
Status: CANCELLED | OUTPATIENT
Start: 2025-04-13

## 2025-04-13 RX ORDER — NALOXONE HYDROCHLORIDE 0.4 MG/ML
INJECTION, SOLUTION INTRAMUSCULAR; INTRAVENOUS; SUBCUTANEOUS PRN
Status: CANCELLED | OUTPATIENT
Start: 2025-04-13

## 2025-04-13 RX ORDER — ACETAMINOPHEN 650 MG/1
650 SUPPOSITORY RECTAL EVERY 6 HOURS PRN
Status: DISCONTINUED | OUTPATIENT
Start: 2025-04-13 | End: 2025-04-15 | Stop reason: HOSPADM

## 2025-04-13 RX ORDER — METOCLOPRAMIDE HYDROCHLORIDE 5 MG/ML
10 INJECTION INTRAMUSCULAR; INTRAVENOUS ONCE
Status: COMPLETED | OUTPATIENT
Start: 2025-04-13 | End: 2025-04-13

## 2025-04-13 RX ORDER — SODIUM CHLORIDE, SODIUM LACTATE, POTASSIUM CHLORIDE, CALCIUM CHLORIDE 600; 310; 30; 20 MG/100ML; MG/100ML; MG/100ML; MG/100ML
INJECTION, SOLUTION INTRAVENOUS
Status: DISCONTINUED | OUTPATIENT
Start: 2025-04-13 | End: 2025-04-13 | Stop reason: SDUPTHER

## 2025-04-13 RX ORDER — OXYCODONE HYDROCHLORIDE 5 MG/1
10 TABLET ORAL PRN
Refills: 0 | Status: CANCELLED | OUTPATIENT
Start: 2025-04-13 | End: 2025-04-13

## 2025-04-13 RX ORDER — DIPHENHYDRAMINE HYDROCHLORIDE 50 MG/ML
12.5 INJECTION, SOLUTION INTRAMUSCULAR; INTRAVENOUS
Status: CANCELLED | OUTPATIENT
Start: 2025-04-13

## 2025-04-13 RX ORDER — SODIUM CHLORIDE 9 MG/ML
INJECTION, SOLUTION INTRAVENOUS CONTINUOUS
Status: DISCONTINUED | OUTPATIENT
Start: 2025-04-13 | End: 2025-04-14

## 2025-04-13 RX ORDER — PROPOFOL 10 MG/ML
INJECTION, EMULSION INTRAVENOUS
Status: DISCONTINUED | OUTPATIENT
Start: 2025-04-13 | End: 2025-04-13 | Stop reason: SDUPTHER

## 2025-04-13 RX ORDER — LISINOPRIL 20 MG/1
40 TABLET ORAL DAILY
Status: DISCONTINUED | OUTPATIENT
Start: 2025-04-14 | End: 2025-04-15 | Stop reason: HOSPADM

## 2025-04-13 RX ORDER — NOREPINEPHRINE BITARTRATE 0.06 MG/ML
1-100 INJECTION, SOLUTION INTRAVENOUS CONTINUOUS
Status: DISCONTINUED | OUTPATIENT
Start: 2025-04-13 | End: 2025-04-13

## 2025-04-13 RX ADMIN — SODIUM CHLORIDE, POTASSIUM CHLORIDE, SODIUM LACTATE AND CALCIUM CHLORIDE: 600; 310; 30; 20 INJECTION, SOLUTION INTRAVENOUS at 12:44

## 2025-04-13 RX ADMIN — PHENYLEPHRINE HYDROCHLORIDE 100 MCG: 10 INJECTION INTRAVENOUS at 12:59

## 2025-04-13 RX ADMIN — SODIUM CHLORIDE 1000 ML: 0.9 INJECTION, SOLUTION INTRAVENOUS at 11:38

## 2025-04-13 RX ADMIN — PHENYLEPHRINE HYDROCHLORIDE 100 MCG: 10 INJECTION INTRAVENOUS at 13:07

## 2025-04-13 RX ADMIN — PANTOPRAZOLE SODIUM 8 MG/HR: 40 INJECTION, POWDER, LYOPHILIZED, FOR SOLUTION INTRAVENOUS at 10:50

## 2025-04-13 RX ADMIN — SODIUM CHLORIDE, SODIUM LACTATE, POTASSIUM CHLORIDE, CALCIUM CHLORIDE: 600; 310; 30; 20 INJECTION, SOLUTION INTRAVENOUS at 12:54

## 2025-04-13 RX ADMIN — SUCCINYLCHOLINE CHLORIDE 160 MG: 20 INJECTION, SOLUTION INTRAMUSCULAR; INTRAVENOUS at 12:59

## 2025-04-13 RX ADMIN — SODIUM CHLORIDE, PRESERVATIVE FREE 10 ML: 5 INJECTION INTRAVENOUS at 21:27

## 2025-04-13 RX ADMIN — SODIUM CHLORIDE: 0.9 INJECTION, SOLUTION INTRAVENOUS at 23:13

## 2025-04-13 RX ADMIN — SODIUM CHLORIDE: 0.9 INJECTION, SOLUTION INTRAVENOUS at 13:58

## 2025-04-13 RX ADMIN — SODIUM CHLORIDE 1000 ML: 0.9 INJECTION, SOLUTION INTRAVENOUS at 10:43

## 2025-04-13 RX ADMIN — LIDOCAINE HYDROCHLORIDE 5 ML: 20 INJECTION, SOLUTION INFILTRATION; PERINEURAL at 12:59

## 2025-04-13 RX ADMIN — PANTOPRAZOLE SODIUM 80 MG: 40 INJECTION, POWDER, LYOPHILIZED, FOR SOLUTION INTRAVENOUS at 10:39

## 2025-04-13 RX ADMIN — PROPOFOL 120 MG: 10 INJECTION, EMULSION INTRAVENOUS at 12:59

## 2025-04-13 RX ADMIN — METOCLOPRAMIDE 10 MG: 5 INJECTION, SOLUTION INTRAMUSCULAR; INTRAVENOUS at 11:38

## 2025-04-13 RX ADMIN — PANTOPRAZOLE SODIUM 40 MG: 40 INJECTION, POWDER, LYOPHILIZED, FOR SOLUTION INTRAVENOUS at 14:16

## 2025-04-13 ASSESSMENT — LIFESTYLE VARIABLES
HOW MANY STANDARD DRINKS CONTAINING ALCOHOL DO YOU HAVE ON A TYPICAL DAY: PATIENT DOES NOT DRINK
HOW OFTEN DO YOU HAVE A DRINK CONTAINING ALCOHOL: NEVER
HOW OFTEN DO YOU HAVE A DRINK CONTAINING ALCOHOL: NEVER
HOW MANY STANDARD DRINKS CONTAINING ALCOHOL DO YOU HAVE ON A TYPICAL DAY: PATIENT DOES NOT DRINK

## 2025-04-13 ASSESSMENT — PAIN - FUNCTIONAL ASSESSMENT: PAIN_FUNCTIONAL_ASSESSMENT: NONE - DENIES PAIN

## 2025-04-13 ASSESSMENT — PAIN DESCRIPTION - LOCATION: LOCATION: ABDOMEN

## 2025-04-13 ASSESSMENT — PAIN SCALES - GENERAL: PAINLEVEL_OUTOF10: 0

## 2025-04-13 NOTE — H&P
[facility] Medicine History & Physical      PCP: Devyngabbie Susan JENNY, APRN - CNP    Date of Admission: 4/13/2025    Date of Service: Pt seen/examined on 04/13/25       Chief Complaint:    Chief Complaint   Patient presents with    Abdominal Pain     Reports dark black emesis and abd pain x 3 days.          History Of Present Illness:      The patient is a 79 y.o. male with PMHx of HTN, GERD, BPH, and Hx of TIA who presents to [facility] for coffee-ground emesis. He has had dark black emesis and abdominal pain that began 3 days ago. He admits to taking motrin lately for dental pain. He started vomiting and having abdominal pain about 4 days ago with his first episode of hematemesis occurring on Wednesday or Thursday. He was taking ibuprofen for his dental pain, then had a dental procedure done on Monday where he had several teeth extracted, and he started using an oral gel. His last dose of ibuprofen was about 5 days ago. He does not have a history of GI bleeds, but has had an appendectomy and hernia repair. He does not remember when his last bowel movement was, but he believes it was before the onsey of his hematemesis. He states that eating makes the pain worse and causes vomiting, which then resolves the pain. The onset of pain is not immediately after eating, but several minutes after. He admits to feeling dizzy and fatigued, and he has noticed he has not been able to do normal activities like walking his dog without getting extremely tired. He denies chest pain, but endorses shortness of breath with activity. He denies syncope or falling. History obtained from the patient and review of EMR.     Past Medical History:        Diagnosis Date    Arthritis     BPH (benign prostatic hyperplasia)     Cataract     bilateral    GERD (gastroesophageal reflux disease)     diverticulitis    HLD (hyperlipidemia) 9/18/2013    HTN (hypertension)     Nephrolithiasis     Pruritus     Tear of meniscus of knee     Left    TIA

## 2025-04-13 NOTE — PLAN OF CARE
Problem: Safety - Adult  Goal: Free from fall injury  4/13/2025 1940 by Genna Tellez, RN  Outcome: Progressing  4/13/2025 1938 by Genna Tellez, RN  Outcome: Progressing     Problem: Discharge Planning  Goal: Discharge to home or other facility with appropriate resources  4/13/2025 1940 by Genna Tellez, RN  Outcome: Progressing  4/13/2025 1938 by Genna Tellez, RN  Outcome: Progressing     Problem: ABCDS Injury Assessment  Goal: Absence of physical injury  4/13/2025 1940 by Genna Tellez, RN  Outcome: Progressing  4/13/2025 1938 by Genan Tellez, RN  Outcome: Progressing

## 2025-04-13 NOTE — ED PROVIDER NOTES
Emergency Department Provider Note  Location: [facility] EMERGENCY DEPARTMENT  4/13/2025     Patient Identification  Leroy Douglass is a 79 y.o. male    Chief Complaint  Abdominal Pain (Reports dark black emesis and abd pain x 3 days. )      Mode of Arrival  private car    HPI  (History provided by patient)  This is a 79 y.o. male with a PMH significant for GERD, TIA, BPH, hypertension  presented today for coffee-ground emesis.  Patient reports that he has had dark black emesis and abdominal pain for the last 3 days.  He reports that he has been taking a significant mount of ibuprofen lately for dental pain.  He has no associated fever.  Reports he has not had a bowel movement and denies any dark black stool.  Abdominal pain is epigastric.  He denies any syncope.  Denies prior episodes.  He is not anticoagulated.      ROS  Review of Systems   Constitutional:  Positive for fatigue.   Gastrointestinal:  Positive for abdominal pain, constipation, nausea and vomiting.   All other systems reviewed and are negative.        I have reviewed the following nursing documentation:  Allergies: No Known Allergies    Past medical history:  has a past medical history of Arthritis, BPH (benign prostatic hyperplasia), Cataract, GERD (gastroesophageal reflux disease), HLD (hyperlipidemia) (9/18/2013), HTN (hypertension), Nephrolithiasis, Pruritus, Tear of meniscus of knee, and TIA (transient ischemic attack) (9/11).    Past surgical history:  has a past surgical history that includes Cystoscopy; Appendectomy; Tonsillectomy; skin biopsy; Plantar fascia surgery; Knee arthroscopy; Shoulder arthroscopy; Mandible surgery; TURP; hernia repair (Right); Knee arthroscopy (01/07/2014); Colonoscopy (before 2005); Cataract removal with implant (Left, 04/19/2018); Cataract removal with implant (Right, 05/17/2018); Insertable Cardiac Monitor (03/12/2020); eye surgery; and Prostate surgery.    Home medications:   Prior to Admission

## 2025-04-13 NOTE — H&P
Gastroenterology Note             Pre-operative History and Physical    Patient: Leroy Douglass  : 1945  CSN:     History Obtained From:  patient and/or guardian.     HISTORY OF PRESENT ILLNESS:    The patient is a 79 y.o. male  here for EGD    Past Medical History:    Past Medical History:   Diagnosis Date    Arthritis     BPH (benign prostatic hyperplasia)     Cataract     bilateral    GERD (gastroesophageal reflux disease)     diverticulitis    HLD (hyperlipidemia) 2013    HTN (hypertension)     Nephrolithiasis     Pruritus     Tear of meniscus of knee     Left    TIA (transient ischemic attack)     Speech Aphasia     Past Surgical History:    Past Surgical History:   Procedure Laterality Date    APPENDECTOMY      CATARACT REMOVAL WITH IMPLANT Left 2018    CATARACT REMOVAL WITH IMPLANT Right 2018    with dr wright     COLONOSCOPY  before     CYSTOSCOPY      Ureteroscopy    EYE SURGERY      HERNIA REPAIR Right     umbilical    INSERTABLE CARDIAC MONITOR  2020    KNEE ARTHROSCOPY      Left    KNEE ARTHROSCOPY  2014    right    MANDIBLE SURGERY      mandibular teresita bilateral    PLANTAR FASCIA SURGERY      PROSTATE SURGERY      SHOULDER ARTHROSCOPY      Left rotator cuff    SKIN BIOPSY      TONSILLECTOMY      TURP       Medications Prior to Admission:   No current facility-administered medications on file prior to encounter.     Current Outpatient Medications on File Prior to Encounter   Medication Sig Dispense Refill    lisinopril (PRINIVIL;ZESTRIL) 40 MG tablet TAKE ONE TABLET BY MOUTH DAILY 90 tablet 3    Omega-3 Fatty Acids (OMEGA-3 FISH OIL PO) Take by mouth      zinc 50 MG TABS tablet Take 1 tablet by mouth daily      Probiotic Product (PROBIOTIC DAILY PO) Take by mouth      COCONUT OIL PO Take by mouth      GARLIC PO Take 1 tablet by mouth 2 times daily      TURMERIC PO Take 1 tablet by mouth daily      Coenzyme Q10 (COQ10 PO) Take 1 tablet by mouth 2 times

## 2025-04-13 NOTE — CONSULTS
Consultation Note    Patient Name: Leroy Douglass  : 1945  Age: 79 y.o.     Admitting Physician: Delmis Meek MD   Date of Admission: 2025 10:13 AM   Primary Care Physician: Susan Valerio, APRN - CNP        Leroy Douglass is being seen at the request of Delmis Meek MD for coffee ground emesis    History of Present Illness:  79 year old male with BPH, GERD, HTN, TIA admitted with coffee ground emesis.  The patient states it started 4 days ago.  He denies any melena or rectal bleeding, has not had a bowel movement.  He complains of epigastric pain.  The patient states he was taking 20 ibuprofen daily for dental pain    GI History:  Remote colonoscopy according to patient, no previous egd.     Past Medical History:  Past Medical History:   Diagnosis Date    Arthritis     BPH (benign prostatic hyperplasia)     Cataract     bilateral    GERD (gastroesophageal reflux disease)     diverticulitis    HLD (hyperlipidemia) 2013    HTN (hypertension)     Nephrolithiasis     Pruritus     Tear of meniscus of knee     Left    TIA (transient ischemic attack)     Speech Aphasia        Past Surgical History:  Past Surgical History:   Procedure Laterality Date    APPENDECTOMY      CATARACT REMOVAL WITH IMPLANT Left 2018    CATARACT REMOVAL WITH IMPLANT Right 2018    with dr wright     COLONOSCOPY  before     CYSTOSCOPY      Ureteroscopy    EYE SURGERY      HERNIA REPAIR Right     umbilical    INSERTABLE CARDIAC MONITOR  2020    KNEE ARTHROSCOPY      Left    KNEE ARTHROSCOPY  2014    right    MANDIBLE SURGERY      mandibular teresita bilateral    PLANTAR FASCIA SURGERY      PROSTATE SURGERY      SHOULDER ARTHROSCOPY      Left rotator cuff    SKIN BIOPSY      TONSILLECTOMY      TURP          Historical Medications:  Prior to Visit Medications    Medication Sig Taking? Authorizing Provider   lisinopril (PRINIVIL;ZESTRIL) 40 MG tablet TAKE ONE TABLET BY MOUTH DAILY Yes

## 2025-04-13 NOTE — ED NOTES
1104 - STAT consult placed to [facility] (GI) for coffee ground emesis with hypotension. Dr. Lilia Valdes is the on call provider at this time per the answering service.     1113 - Received return call from Dr. Valdes. Call given to Dr. Butler. Case is being discussed at this time.

## 2025-04-14 PROBLEM — N17.9 AKI (ACUTE KIDNEY INJURY): Status: ACTIVE | Noted: 2025-04-14

## 2025-04-14 LAB
ANION GAP SERPL CALCULATED.3IONS-SCNC: 11 MMOL/L (ref 3–16)
BASOPHILS # BLD: 0 K/UL (ref 0–0.2)
BASOPHILS NFR BLD: 0.5 %
BUN SERPL-MCNC: 54 MG/DL (ref 7–20)
CALCIUM SERPL-MCNC: 8.1 MG/DL (ref 8.3–10.6)
CHLORIDE SERPL-SCNC: 112 MMOL/L (ref 99–110)
CO2 SERPL-SCNC: 15 MMOL/L (ref 21–32)
CREAT SERPL-MCNC: 2.9 MG/DL (ref 0.8–1.3)
DEPRECATED RDW RBC AUTO: 14 % (ref 12.4–15.4)
EOSINOPHIL # BLD: 0.2 K/UL (ref 0–0.6)
EOSINOPHIL NFR BLD: 3.1 %
GFR SERPLBLD CREATININE-BSD FMLA CKD-EPI: 21 ML/MIN/{1.73_M2}
GLUCOSE SERPL-MCNC: 91 MG/DL (ref 70–99)
HCT VFR BLD AUTO: 29.7 % (ref 40.5–52.5)
HCT VFR BLD AUTO: 31.5 % (ref 40.5–52.5)
HCT VFR BLD AUTO: 31.9 % (ref 40.5–52.5)
HCT VFR BLD AUTO: 33.1 % (ref 40.5–52.5)
HCT VFR BLD AUTO: 33.5 % (ref 40.5–52.5)
HGB BLD-MCNC: 10.3 G/DL (ref 13.5–17.5)
HGB BLD-MCNC: 10.9 G/DL (ref 13.5–17.5)
HGB BLD-MCNC: 11 G/DL (ref 13.5–17.5)
HGB BLD-MCNC: 11.4 G/DL (ref 13.5–17.5)
HGB BLD-MCNC: 11.5 G/DL (ref 13.5–17.5)
IRON SATN MFR SERPL: 42 % (ref 20–50)
IRON SERPL-MCNC: 71 UG/DL (ref 59–158)
LYMPHOCYTES # BLD: 1.4 K/UL (ref 1–5.1)
LYMPHOCYTES NFR BLD: 19.6 %
MCH RBC QN AUTO: 31.5 PG (ref 26–34)
MCHC RBC AUTO-ENTMCNC: 35 G/DL (ref 31–36)
MCV RBC AUTO: 90.2 FL (ref 80–100)
MONOCYTES # BLD: 0.7 K/UL (ref 0–1.3)
MONOCYTES NFR BLD: 9.7 %
NEUTROPHILS # BLD: 4.9 K/UL (ref 1.7–7.7)
NEUTROPHILS NFR BLD: 67.1 %
PLATELET # BLD AUTO: 243 K/UL (ref 135–450)
PMV BLD AUTO: 7.7 FL (ref 5–10.5)
POTASSIUM SERPL-SCNC: 3.8 MMOL/L (ref 3.5–5.1)
RBC # BLD AUTO: 3.5 M/UL (ref 4.2–5.9)
SODIUM SERPL-SCNC: 138 MMOL/L (ref 136–145)
TIBC SERPL-MCNC: 168 UG/DL (ref 260–445)
WBC # BLD AUTO: 7.3 K/UL (ref 4–11)

## 2025-04-14 PROCEDURE — 85025 COMPLETE CBC W/AUTO DIFF WBC: CPT

## 2025-04-14 PROCEDURE — 99232 SBSQ HOSP IP/OBS MODERATE 35: CPT | Performed by: INTERNAL MEDICINE

## 2025-04-14 PROCEDURE — 80048 BASIC METABOLIC PNL TOTAL CA: CPT

## 2025-04-14 PROCEDURE — 2500000003 HC RX 250 WO HCPCS

## 2025-04-14 PROCEDURE — 85018 HEMOGLOBIN: CPT

## 2025-04-14 PROCEDURE — 2060000000 HC ICU INTERMEDIATE R&B

## 2025-04-14 PROCEDURE — 2500000003 HC RX 250 WO HCPCS: Performed by: INTERNAL MEDICINE

## 2025-04-14 PROCEDURE — 2580000003 HC RX 258

## 2025-04-14 PROCEDURE — 83550 IRON BINDING TEST: CPT

## 2025-04-14 PROCEDURE — 83540 ASSAY OF IRON: CPT

## 2025-04-14 PROCEDURE — 6360000002 HC RX W HCPCS

## 2025-04-14 PROCEDURE — 85014 HEMATOCRIT: CPT

## 2025-04-14 PROCEDURE — 2580000003 HC RX 258: Performed by: INTERNAL MEDICINE

## 2025-04-14 PROCEDURE — 36415 COLL VENOUS BLD VENIPUNCTURE: CPT

## 2025-04-14 RX ADMIN — SODIUM CHLORIDE: 0.9 INJECTION, SOLUTION INTRAVENOUS at 09:18

## 2025-04-14 RX ADMIN — SODIUM CHLORIDE, PRESERVATIVE FREE 10 ML: 5 INJECTION INTRAVENOUS at 07:44

## 2025-04-14 RX ADMIN — PANTOPRAZOLE SODIUM 40 MG: 40 INJECTION, POWDER, LYOPHILIZED, FOR SOLUTION INTRAVENOUS at 14:06

## 2025-04-14 RX ADMIN — SODIUM BICARBONATE: 84 INJECTION, SOLUTION INTRAVENOUS at 12:17

## 2025-04-14 RX ADMIN — SODIUM BICARBONATE: 84 INJECTION, SOLUTION INTRAVENOUS at 21:26

## 2025-04-14 RX ADMIN — PANTOPRAZOLE SODIUM 40 MG: 40 INJECTION, POWDER, LYOPHILIZED, FOR SOLUTION INTRAVENOUS at 03:47

## 2025-04-14 RX ADMIN — SODIUM CHLORIDE, PRESERVATIVE FREE 10 ML: 5 INJECTION INTRAVENOUS at 21:32

## 2025-04-14 NOTE — FLOWSHEET NOTE
04/14/25 0714   Vital Signs   Temp 98 °F (36.7 °C)   Temp Source Oral   Pulse 75   Heart Rate Source Monitor   Respirations 16   /63   MAP (Calculated) 79   BP Location Right upper arm   BP Method Automatic   Patient Position Semi fowlers   Oxygen Therapy   SpO2 97 %   O2 Device None (Room air)     Patient resting quietly in bed. Up to chair at this time per patient request. No s/s of distress noted. Shift assessment complete, see flow sheet. Denies needs at this time. Call light in reach. Will monitor.

## 2025-04-14 NOTE — ACP (ADVANCE CARE PLANNING)
Advance Care Planning     General Advance Care Planning (ACP) Conversation    Date of Conversation: 4/14/2025  Conducted with: Patient with Decision Making Capacity  Other persons present: None    Healthcare Decision Maker:   Primary Decision Maker: Carmela Cameron - Brother/Sister - [phone]       Content/Action Overview:  Has ACP document(s) on file - do NOT reflect the patient's care preferences, patient to provide new document(s)  Reviewed DNR/DNI and patient elects Full Code (Attempt Resuscitation)        Length of Voluntary ACP Conversation in minutes:  <16 minutes (Non-Billable)    Omayra Ruiz RN

## 2025-04-14 NOTE — ACP (ADVANCE CARE PLANNING)
Advance Care Planning     Advance Care Planning Inpatient Note  [facility] Department    Today's Date: 4/14/2025  Unit: [facility]LEMETRY    Received request from IDT Member.  Upon review of chart and communication with care team, patient's decision making abilities are not in question.. Patient was/were present in the room during visit.    Goals of ACP Conversation:  Discuss advance care planning documents  Facilitate a discussion related to patient's goals of care as they align with the patient's values and beliefs.    Health Care Decision Makers:       Primary Decision Maker: Carmela Cameron - Brother/Sister - [phone]  Summary:  Verified Documents    Advance Care Planning Documents (Patient Wishes):  Living Will/Advance Directive     Assessment:  Leroy expressed that already had Living Will document but did not know if Advance Directive document had been explained.  explained AD and included [place] hierarchy of decision makers in conversation. Leroy shared life review stories of his family and his powerful rj journey.      Interventions:  Provided education on documents for clarity and greater understanding  Discussed and provided education on state decision maker hierarchy  Reviewed but did not complete ACP document    Care Preferences Communicated:   No    Outcomes/Plan:  ACP Discussion: Completed  Teach Back Method used to verify the patient's and/or Healthcare Decision Maker's understanding of key information in the advance directive documents  * explained AD and included [facility] of decision makers in conversation. Leroy has Living Will document in chart. Declined completing an Advance Directive and just have a Living Will.     Electronically signed by Chaplain Nico on 4/14/2025 at 11:08 AM

## 2025-04-14 NOTE — CARE COORDINATION
Case Management Assessment  Initial Evaluation    Date/Time of Evaluation: 4/14/2025 8:46 AM  Assessment Completed by: Omayra Ruiz RN    If patient is discharged prior to next notation, then this note serves as note for discharge by case management.    Patient Name: Leroy Douglass                   YOB: 1945  Diagnosis: Normocytic anemia [D64.9]  Epigastric pain [R10.13]  GI bleed [K92.2]  MAGALYS (acute kidney injury) [N17.9]  Gastrointestinal hemorrhage with hematemesis [K92.0]  Gastrointestinal hemorrhage, unspecified gastrointestinal hemorrhage type [K92.2]                   Date / Time: 4/13/2025 10:13 AM    Patient Admission Status: Inpatient   Readmission Risk (Low < 19, Mod (19-27), High > 27): Readmission Risk Score: 11    Current PCP: Susan Valerio APRN - CNP  PCP verified by CM? Yes    Chart Reviewed: Yes      History Provided by: Patient  Patient Orientation: Alert and Oriented    Patient Cognition: Alert    Hospitalization in the last 30 days (Readmission):  No    If yes, Readmission Assessment in CM Navigator will be completed.    Advance Directives:      Code Status: Full Code   Patient's Primary Decision Maker is: Legal Next of Kin    Primary Decision Maker: Carmela Cameron - Brother/Sister - [phone]    Discharge Planning:    Patient lives with: Alone Type of Home: House  Primary Care Giver: Self  Patient Support Systems include: Family Members   Current Financial resources: Medicare  Current community resources: None  Current services prior to admission: None            Current DME:              Type of Home Care services:  None    ADLS  Prior functional level: Independent in ADLs/IADLs  Current functional level: Independent in ADLs/IADLs    PT AM-PAC:   /24  OT AM-PAC:   /24    Family can provide assistance at DC: Yes  Would you like Case Management to discuss the discharge plan with any other family members/significant others, and if so, who? No  Plans to Return to Present Housing:

## 2025-04-15 VITALS
HEIGHT: 60 IN | OXYGEN SATURATION: 97 % | SYSTOLIC BLOOD PRESSURE: 119 MMHG | TEMPERATURE: 97.7 F | BODY MASS INDEX: 3984.1 KG/M2 | RESPIRATION RATE: 18 BRPM | HEART RATE: 73 BPM | DIASTOLIC BLOOD PRESSURE: 74 MMHG

## 2025-04-15 LAB
ANION GAP SERPL CALCULATED.3IONS-SCNC: 10 MMOL/L (ref 3–16)
BASOPHILS # BLD: 0 K/UL (ref 0–0.2)
BASOPHILS NFR BLD: 0.7 %
BUN SERPL-MCNC: 40 MG/DL (ref 7–20)
CALCIUM SERPL-MCNC: 8.3 MG/DL (ref 8.3–10.6)
CHLORIDE SERPL-SCNC: 113 MMOL/L (ref 99–110)
CO2 SERPL-SCNC: 21 MMOL/L (ref 21–32)
CREAT SERPL-MCNC: 1.5 MG/DL (ref 0.8–1.3)
DEPRECATED RDW RBC AUTO: 13.7 % (ref 12.4–15.4)
EOSINOPHIL # BLD: 0.2 K/UL (ref 0–0.6)
EOSINOPHIL NFR BLD: 3.6 %
GFR SERPLBLD CREATININE-BSD FMLA CKD-EPI: 47 ML/MIN/{1.73_M2}
GLUCOSE SERPL-MCNC: 101 MG/DL (ref 70–99)
HCT VFR BLD AUTO: 28.8 % (ref 40.5–52.5)
HGB BLD-MCNC: 10 G/DL (ref 13.5–17.5)
LYMPHOCYTES # BLD: 1.4 K/UL (ref 1–5.1)
LYMPHOCYTES NFR BLD: 22.9 %
MCH RBC QN AUTO: 31.3 PG (ref 26–34)
MCHC RBC AUTO-ENTMCNC: 34.6 G/DL (ref 31–36)
MCV RBC AUTO: 90.4 FL (ref 80–100)
MONOCYTES # BLD: 0.6 K/UL (ref 0–1.3)
MONOCYTES NFR BLD: 9.1 %
NEUTROPHILS # BLD: 3.9 K/UL (ref 1.7–7.7)
NEUTROPHILS NFR BLD: 63.7 %
PLATELET # BLD AUTO: 226 K/UL (ref 135–450)
PMV BLD AUTO: 7.1 FL (ref 5–10.5)
POTASSIUM SERPL-SCNC: 3.8 MMOL/L (ref 3.5–5.1)
RBC # BLD AUTO: 3.19 M/UL (ref 4.2–5.9)
SODIUM SERPL-SCNC: 144 MMOL/L (ref 136–145)
WBC # BLD AUTO: 6.2 K/UL (ref 4–11)

## 2025-04-15 PROCEDURE — 2580000003 HC RX 258: Performed by: INTERNAL MEDICINE

## 2025-04-15 PROCEDURE — 2580000003 HC RX 258

## 2025-04-15 PROCEDURE — 2500000003 HC RX 250 WO HCPCS: Performed by: INTERNAL MEDICINE

## 2025-04-15 PROCEDURE — 80048 BASIC METABOLIC PNL TOTAL CA: CPT

## 2025-04-15 PROCEDURE — 36415 COLL VENOUS BLD VENIPUNCTURE: CPT

## 2025-04-15 PROCEDURE — 99238 HOSP IP/OBS DSCHRG MGMT 30/<: CPT | Performed by: INTERNAL MEDICINE

## 2025-04-15 PROCEDURE — 85025 COMPLETE CBC W/AUTO DIFF WBC: CPT

## 2025-04-15 PROCEDURE — 6360000002 HC RX W HCPCS

## 2025-04-15 RX ORDER — PANTOPRAZOLE SODIUM 40 MG/1
40 TABLET, DELAYED RELEASE ORAL 2 TIMES DAILY
Qty: 60 TABLET | Refills: 1 | Status: SHIPPED | OUTPATIENT
Start: 2025-04-15

## 2025-04-15 RX ADMIN — SODIUM BICARBONATE: 84 INJECTION, SOLUTION INTRAVENOUS at 05:31

## 2025-04-15 RX ADMIN — PANTOPRAZOLE SODIUM 40 MG: 40 INJECTION, POWDER, LYOPHILIZED, FOR SOLUTION INTRAVENOUS at 02:28

## 2025-04-15 ASSESSMENT — PAIN SCALES - GENERAL: PAINLEVEL_OUTOF10: 0

## 2025-04-15 NOTE — FLOWSHEET NOTE
Patient sitting up in chair in room. Alert. VSS. Safety precautions in place. Denies N/V at this time. Call light placed within reach.      04/15/25 0945   Vital Signs   Temp 97.7 °F (36.5 °C)   Temp Source Oral   Pulse 73   Heart Rate Source Monitor   Respirations 18   /74   MAP (Calculated) 89   BP Location Right upper arm   Patient Position Up in chair   Pain Assessment   Pain Assessment None - Denies Pain   Pain Level 0   Oxygen Therapy   SpO2 97 %   Pulse Oximetry Type Intermittent   O2 Device None (Room air)

## 2025-04-15 NOTE — DISCHARGE SUMMARY
Name:  Leroy Douglass  Room:  /0324-01  MRN:    [id number]    Discharge Summary      This discharge summary is in conjunction with a complete physical exam done on the day of discharge.      Discharging Physician: RICHARD MENESES MD      Admit: 4/13/2025  Discharge:  4/15/2025     Diagnoses this Admission    Principal Problem:    GI bleed  Active Problems:    BPH (benign prostatic hyperplasia)    HTN (hypertension)    GERD (gastroesophageal reflux disease)    SOB (shortness of breath)    Lightheadedness    MAGALYS (acute kidney injury)  Resolved Problems:    * No resolved hospital problems. *      Procedures (Please Review Full Report for Details)  EGD     Consults    IP CONSULT TO GI  IP CONSULT TO SPIRITUAL SERVICES      HPI:    The patient is a 79 y.o. male with PMHx of HTN, GERD, BPH, and Hx of TIA who presents to [facility] for coffee-ground emesis. He has had dark black emesis and abdominal pain that began 3 days ago. He admits to taking motrin lately for dental pain. He started vomiting and having abdominal pain about 4 days ago with his first episode of hematemesis occurring on Wednesday or Thursday. He was taking ibuprofen for his dental pain, then had a dental procedure done on Monday where he had several teeth extracted, and he started using an oral gel. His last dose of ibuprofen was about 5 days ago. He does not have a history of GI bleeds, but has had an appendectomy and hernia repair. He does not remember when his last bowel movement was, but he believes it was before the onsey of his hematemesis. He states that eating makes the pain worse and causes vomiting, which then resolves the pain. The onset of pain is not immediately after eating, but several minutes after. He admits to feeling dizzy and fatigued, and he has noticed he has not been able to do normal activities like walking his dog without getting extremely tired. He denies chest pain, but endorses shortness of breath with

## 2025-04-15 NOTE — PROGRESS NOTES
Admit: 2025    Name:  Leroy Douglass  Room:  /0324-01  MRN:    [id number]    Daily Progress Note for 4/15/2025   Admitted with GI bleed, hypotension and MAGALYS  Interval History:   No abd pain  No GI bleeding   Scheduled Meds:   [Held by provider] lisinopril  40 mg Oral Daily    sodium chloride flush  5-40 mL IntraVENous 2 times per day    pantoprazole (PROTONIX) 40 mg in sodium chloride (PF) 0.9 % 10 mL injection  40 mg IntraVENous Q12H       Continuous Infusions:   sodium bicarbonate 75 mEq in sodium chloride 0.45 % 1,000 mL infusion 125 mL/hr at 04/15/25 0531    sodium chloride         PRN Meds:  sodium chloride flush, sodium chloride, ondansetron **OR** ondansetron, acetaminophen **OR** acetaminophen                  Objective:     Temp  Av.9 °F (36.6 °C)  Min: 97.7 °F (36.5 °C)  Max: 98.2 °F (36.8 °C)  Pulse  Av.5  Min: 67  Max: 84  BP  Min: 112/70  Max: 146/68  SpO2  Av.5 %  Min: 95 %  Max: 98 %  Patient Vitals for the past 4 hrs:   BP Temp Temp src Pulse Resp SpO2   04/15/25 0945 119/74 97.7 °F (36.5 °C) Oral 73 18 97 %         Intake/Output Summary (Last 24 hours) at 4/15/2025 1036  Last data filed at 4/15/2025 0850  Gross per 24 hour   Intake 402 ml   Output 1425 ml   Net -1023 ml       Physical Exam:    Gen: No distress. Alert. Pale.   Eyes: PERRL. No sclera icterus. No conjunctival injection.   ENT: No discharge.   Neck: No JVD. Trachea midline.  Resp: No accessory muscle use. No crackles. No wheezes. No rhonchi.   CV: Regular rate. Regular rhythm. + murmur.  No rub. No edema.   GI:no  upper abdominal tenderness. No masses. No organomegaly. Normal bowel sounds.   Skin: Warm and dry. No nodule on exposed extremities. No rash on exposed extremities.   M/S: No cyanosis. No joint deformity. No clubbing.   Neuro: Awake. Grossly nonfocal    Psych: Oriented x 3. No anxiety or agitation.     Lab Data:  CBC:   Recent Labs     25  1031 25  1138 25  0530 25  1202 
Bedside report given to WALLACE Vail. Care transferred.     
Bedside report given to WALLACE Vail. Care transferred.     
EGD completed and tolerated well. No active bleeding. Sedation per anesthesia.  
Handoff report given to Makrus GONSALEZ. Care transferred.   
Handoff report given to WALLACE Hunter. Care transferred.   
INPATIENT PROGRESS NOTE        IDENTIFYING DATA/REASON FOR CONSULTATION   PATIENT:  Leroy Douglass  MRN:  [id number]  ADMIT DATE: 2025  TIME OF EVALUATION: 2025 3:02 PM  HOSPITAL STAY:   LOS: 1 day   CONSULTING PHYSICIAN: Delmis Meek MD   REASON FOR CONSULTATION: coffee ground emesis     Subjective:    Patient seen in follow-up.  For upper GIB 2/2 DU.  Did well overnight no overt GI bleeding.  Hemoglobin is stable.    MEDICATIONS   SCHEDULED:  [Held by provider] lisinopril, 40 mg, Daily  sodium chloride flush, 5-40 mL, 2 times per day  pantoprazole (PROTONIX) 40 mg in sodium chloride (PF) 0.9 % 10 mL injection, 40 mg, Q12H      FLUIDS/DRIPS:     sodium bicarbonate 75 mEq in sodium chloride 0.45 % 1,000 mL infusion 125 mL/hr at 25 1217    sodium chloride       PRNs: sodium chloride flush, 5-40 mL, PRN  sodium chloride, , PRN  ondansetron, 4 mg, Q8H PRN   Or  ondansetron, 4 mg, Q6H PRN  acetaminophen, 650 mg, Q6H PRN   Or  acetaminophen, 650 mg, Q6H PRN      ALLERGIES:  No Known Allergies      PHYSICAL EXAM   VITALS:  /75   Pulse 81   Temp 97.7 °F (36.5 °C) (Oral)   Resp 16   Ht (!) 0.152 m (6\")   SpO2 96%   BMI 3984.10 kg/m²   TEMPERATURE:  Current - Temp: 97.7 °F (36.5 °C); Max - Temp  Av °F (36.7 °C)  Min: 97.7 °F (36.5 °C)  Max: 98.6 °F (37 °C)    Physical Exam:  General appearance: alert, cooperative, no distress, appears stated age  Eyes: Anicteric  Head: Normocephalic, without obvious abnormality  Lungs: clear to auscultation bilaterally, Normal Effort  Heart: regular rate and rhythm, normal S1 and S2, no murmurs or rubs  Abdomen: soft, non-distended, non-tender. Bowel sounds normal. No masses,  no organomegaly.   Extremities: atraumatic, no cyanosis or edema  Skin: warm and dry, no jaundice  Neuro: Grossly intact, A&OX3    LABS AND IMAGING   Laboratory   Recent Labs     25  1031 25  1138 25  0013 25  0530 25  1202   WBC 11.0  --   --  7.3  
PM assessment completed. Scheduled medications given per MAR. VSS room air, A/O x4. Patient does not appear in distress and denies any needs at this time. Call light in reach, will monitor, bed alarm on.     
PM assessment completed. Scheduled medications given per MAR. VSS room air, A/O x4. Patient does not appear in distress and denies any needs at this time. Call light in reach, will monitor.     
Patient admitted to room 324 from Endoscopy. Patient oriented to room, call light, bed rails, phone, lights and bathroom. Patient instructed about the schedule of the day including: vital sign frequency, lab draws, possible tests, frequency of MD and staff rounds, daily weights, I &O's and prescribed diet.  Telemetry box in place, patient aware of placement and reason. Bed locked, in lowest position, side rails up 2/4, call light within reach.        Recliner Assessment  Patient is able to demonstrate the ability to move from a reclining position to an upright position within the recliner.       4 Eyes Skin Assessment     NAME:  Leroy Douglass  YOB: 1945  MEDICAL RECORD NUMBER:  [id number]    The patient is being assessed for  Admission    I agree that at least one RN has performed a thorough Head to Toe Skin Assessment on the patient. ALL assessment sites listed below have been assessed.      Areas assessed by both nurses:    Head, Face, Ears, Shoulders, Back, Chest, Arms, Elbows, Hands, Sacrum. Buttock, Coccyx, Ischium, Legs. Feet and Heels, and Under Medical Devices         Does the Patient have a Wound? No noted wound(s)       Papo Prevention initiated by RN: No  Wound Care Orders initiated by RN: No    Pressure Injury (Stage 3,4, Unstageable, DTI, NWPT, and Complex wounds) if present, place Wound referral order by RN under : No    New Ostomies, if present place, Ostomy referral order under : No     Nurse 1 eSignature: Electronically signed by Estrella Dunn RN on 4/13/25 at 2:35 PM EDT    **SHARE this note so that the co-signing nurse can place an eSignature**    Nurse 2 eSignature: Electronically signed by Kenyatta Smith RN on 4/13/25 at 3:15 PM EDT    
Patient educated on discharge instructions as well as new medications use, dosage, administration and possible side effects.  Patient verified knowledge. IV removed without difficulty and dry dressing in place. Telemetry monitor removed and returned to CMU. Pt left facility in stable condition to Home with all of their personal belongings.    
Pre-Operative:  1.  Patient/Caregiver identifies - states name and date of birth.  2.  The patient is free from signs and symptoms of injury.  3.  The patient receives appropriate medication(s), safely administered during the Perioperative period.  4.  The patients's fluid, electrolyte, and acid-base balances are established preoperatively.  5.  The patient's pulmonary function is established preoperatively.  6.  The patient's cardiovascular status is established preoperatively.  7.  The patient / caregiver demonstrates knowledge of nutritional management related to the operative or other invasive procedure.  8.  The patient/caregiver demonstrates knowledge of medication management.  9.  The patient/caregiver demonstrates knowledge of pain management.  10.  The patient/caregiver participates in decisions affection his or her Perioperative plan of care.  11.  The patient's care is consistent with the individualized Perioperative plan of care.  12.  The patient's right to privacy is maintained.  13.  The patient is the recipient of competent and ethical care within legal standards of practice.  14.  The patient's value system, lifestyle, ethnicity, and culture are considered, respected, and incorporated in the Perioperative plan of care and understands special services available.  15.  The patient demonstrates and/or reports adequate pain control throughout the the Perioperative period.  16.  The patient's neurological status is established preoperatively.  17.  The patient/caregiver demonstrates knowledge of the expected responses to the endoscopy procedure.  18.  Patient/Caregiver has reduced anxiety.  Interventions- Familiarize with environment and equipment.  19. Patient/Caregiver verbalizes understanding of Phase II and/or Phase I process.  20.  Patient pain level is established preoperatively using age appropriate pain scale.  21.  The patient will move to fall risk upon sedation- during and through the recovery 
Spiritual Health History and Assessment/Progress Note  [facility]    (P) Advance Care Planning, Spiritual/Emotional Needs, (P) Emotional distress,  ,      Name: Leroy Douglass MRN: [id number]    Age: 79 y.o.     Sex: male   Language: English   Congregational: Non-Yazidism   GI bleed     Date: 4/14/2025            Total Time Calculated: (P) 65 min              Spiritual [place] [facility] [place]        Referral/Consult From: (P) Nurse   Encounter Overview/Reason: (P) Advance Care Planning, Spiritual/Emotional Needs  Service Provided For: (P) Patient    Lyn, Belief, Meaning:   Patient identifies as spiritual, is connected with a lyn tradition or spiritual practice, and has beliefs or practices that help with coping during difficult times  Family/Friends No family/friends present      Importance and Influence:  Patient has spiritual/personal beliefs that influence decisions regarding their health  Family/Friends No family/friends present    Community:  Patient is connected with a spiritual community and feels well-supported. Support system includes: Lyn Community, Friends, and Extended family  Family/Friends No family/friends present    Assessment and Plan of Care:     Patient Interventions include: Facilitated expression of thoughts and feelings, Explored spiritual coping/struggle/distress, Engaged in theological reflection, Affirmed coping skills/support systems, and Facilitated life review and/ or legacy  Family/Friends Interventions include: No family/friends present    Patient Plan of Care: Spiritual Care available upon further referral  Family/Friends Plan of Care: No family/friends present    Electronically signed by Chaplain Nico on 4/14/2025 at 11:18 AM  
Non-bleeding duodenal ulcers with a clean ulcer base (Dameon Class III).     #Acute kidney injury.  -Cr 4, baseline 1.2  -likely 2/2 GI bleed/ fluid loss  -avoid nephrotoxic agents  -daily BMP  -IVF    Met acidosis   NAGMA   Start bicarb IVF      #Elevated troponin  -initial 56->46, stable down trending   -EKG: nonischemic   -likely 2/2 demand from above  -monitor     #Hypertension with hypotensive episodes  -Monitor BP  -hypotensive here likely 2/2 GI bleed  -on lisinopril at home- hold 2/2 hypotension and MAGALYS     #Obesity Class I  -There is no height or weight on file to calculate BMI.  -Complicating assessment and treatment. Placing patient at risk for multiple co-morbidities as well as early death and contributing to the patient's presentation.   -Counseled on weight loss.     Note above makes patient higher risk for morbidity and mortality requiring testing and treatment.   DVT Prophylaxis: SCDs  Diet: gen  Code Status: Prior    RICHARD MENESES MD

## 2025-04-16 ENCOUNTER — CARE COORDINATION (OUTPATIENT)
Dept: CASE MANAGEMENT | Age: 80
End: 2025-04-16

## 2025-04-16 ENCOUNTER — TELEPHONE (OUTPATIENT)
Dept: FAMILY MEDICINE CLINIC | Age: 80
End: 2025-04-16

## 2025-04-16 DIAGNOSIS — K92.2 GASTROINTESTINAL HEMORRHAGE, UNSPECIFIED GASTROINTESTINAL HEMORRHAGE TYPE: Primary | ICD-10-CM

## 2025-04-16 PROCEDURE — 1111F DSCHRG MED/CURRENT MED MERGE: CPT | Performed by: NURSE PRACTITIONER

## 2025-04-16 NOTE — TELEPHONE ENCOUNTER
Called to schedule Pt for a hospital follow up. [facility]. Two readings of 181/86 and 178/84. Pt is scheduled tomorrow at 4 for a hospital follow up. Pt states they took him off lisinopril.

## 2025-04-16 NOTE — CARE COORDINATION
Care Transitions Note    Initial Call - Call within 2 business days of discharge: Yes    Patient Current Location:  Home: [address] [place], [place] [id number]    Care Transition Nurse contacted the patient by telephone to perform post hospital discharge assessment, verified name and  as identifiers.  Provided introduction to self, and explanation of the Care Transition Nurse role.    Patient: Leroy Douglass    Patient : 1945   MRN: [id number]    Reason for Admission: GI bleed  Discharge Date: 4/15/25  RURS: Readmission Risk Score: 9.4      Last Discharge Facility       Date Complaint Diagnosis Description Type Department Provider    25 Abdominal Pain Gastrointestinal hemorrhage, unspecified gastrointestinal hemorrhage type ... ED to Hosp-Admission (Discharged) (ADMITTED) [facility] Delmis Meek MD; Ngozi Butler..            Was this an external facility discharge? No    Additional needs identified to be addressed with provider   No needs identified             Method of communication with provider: none.    Patients top risk factors for readmission: medical condition-.    Interventions to address risk factors:   Education: .  Review of patient management of conditions/medications: .    Care Summary Note: Patient reports that he is doing well, feeling \"a little bloated from the IV saline\".  CTN inquired about bloating, he reports that ankles are swollen from the IV fluids.  CTN inquired about whether or not he has had this before, he has not.  He is not SOB and reports that swelling has gone down I his hands, but it remains in his lower extremities.  CTN encouraged him to elevate feet as much as possible, he verbalized understanding.    -He reports that he is returning to work today.  -He spoke with PCP office, does not have appointment, stated they do not require him to come in.  CTN will route a message to PCP office requesting an appointment.    -He reports normal BM's and denies any N&V,

## 2025-04-17 ENCOUNTER — OFFICE VISIT (OUTPATIENT)
Dept: FAMILY MEDICINE CLINIC | Age: 80
End: 2025-04-17

## 2025-04-17 VITALS
WEIGHT: 211.8 LBS | DIASTOLIC BLOOD PRESSURE: 62 MMHG | HEART RATE: 85 BPM | BODY MASS INDEX: 28.69 KG/M2 | SYSTOLIC BLOOD PRESSURE: 126 MMHG | OXYGEN SATURATION: 98 % | HEIGHT: 72 IN

## 2025-04-17 DIAGNOSIS — I10 PRIMARY HYPERTENSION: Primary | ICD-10-CM

## 2025-04-17 DIAGNOSIS — Z09 HOSPITAL DISCHARGE FOLLOW-UP: ICD-10-CM

## 2025-04-17 DIAGNOSIS — K26.4 GASTROINTESTINAL HEMORRHAGE ASSOCIATED WITH DUODENAL ULCER: ICD-10-CM

## 2025-04-17 RX ORDER — AMLODIPINE BESYLATE 5 MG/1
5 TABLET ORAL DAILY
Qty: 30 TABLET | Refills: 3 | Status: SHIPPED | OUTPATIENT
Start: 2025-04-17

## 2025-04-17 NOTE — TELEPHONE ENCOUNTER
Notified  patient that Susan wants patient to restart the lisinopril and to take it 2 hours before his appointment today. Patient expressed understanding and stated that he also took one lisinopril last night after he got sent home from work.

## 2025-04-17 NOTE — PROGRESS NOTES
Post-Discharge Transitional Care  Follow Up      Leroy Douglass   YOB: 1945    Date of Office Visit:  4/17/2025  Date of Hospital Admission: 4/13/25  Date of Hospital Discharge: 4/15/25  Risk of hospital readmission (high >=14%. Medium >=10%) :Readmission Risk Score: 9.4      Care management risk score Rising risk (score 2-5) and Complex Care (Scores >=6): No Risk Score On File     Non face to face  following discharge, date last encounter closed (first attempt may have been earlier): 04/16/2025    Call initiated 2 business days of discharge: Yes    ASSESSMENT/PLAN:   Primary hypertension  -     amLODIPine (NORVASC) 5 MG tablet; Take 1 tablet by mouth daily, Disp-30 tablet, R-3D/c lisinoprilNormal  -     Comprehensive Metabolic Panel; Future  Gastrointestinal hemorrhage associated with duodenal ulcer  -     CBC with Auto Differential; Future  -     Iron and TIBC; Future  -     Ferritin; Future  -     Comprehensive Metabolic Panel; Future  Hospital discharge follow-up  -     GA DISCHARGE MEDS RECONCILED W/ CURRENT OUTPATIENT MED LIST      Medical Decision Making: moderate complexity  No follow-ups on file.           Subjective:   HPI:  Follow up of Hospital problems/diagnosis(es): GI bleed- BP was also low- was taken off lisinopril for low BP and MAGALYS.   Yesterday went to work- got BP checked and BP was 181/86 pulse was 86. Then 172/94 pulse 65  Took a lisinopril 40mg when he went home last night. Also took a dose about 2 hours ago.  States he still feels bloated since being on IV fluids at the hospital.    Taking protonix 40mg BID. Denies anymore bleeding. States BM are normal now. Eating well.   States he does get tired more easily- a little SOB- will recheck labs Monday      Inpatient course: Discharge summary reviewed- see chart.    Interval history/Current status: stable    Patient Active Problem List   Diagnosis    TIA (transient ischemic attack)    Expressive aphasia    BPH (benign prostatic

## 2025-04-17 NOTE — TELEPHONE ENCOUNTER
Tell him to get back on his lisinopril- would like him to take it at least 2 hours before his appt today

## 2025-04-18 ENCOUNTER — CARE COORDINATION (OUTPATIENT)
Dept: CASE MANAGEMENT | Age: 80
End: 2025-04-18

## 2025-04-21 ENCOUNTER — OFFICE VISIT (OUTPATIENT)
Dept: FAMILY MEDICINE CLINIC | Age: 80
End: 2025-04-21
Payer: MEDICARE

## 2025-04-21 VITALS
HEART RATE: 64 BPM | BODY MASS INDEX: 28.55 KG/M2 | OXYGEN SATURATION: 97 % | WEIGHT: 210.8 LBS | DIASTOLIC BLOOD PRESSURE: 70 MMHG | SYSTOLIC BLOOD PRESSURE: 120 MMHG | HEIGHT: 72 IN

## 2025-04-21 DIAGNOSIS — K26.4 GASTROINTESTINAL HEMORRHAGE ASSOCIATED WITH DUODENAL ULCER: ICD-10-CM

## 2025-04-21 DIAGNOSIS — I10 PRIMARY HYPERTENSION: ICD-10-CM

## 2025-04-21 DIAGNOSIS — I10 PRIMARY HYPERTENSION: Primary | ICD-10-CM

## 2025-04-21 LAB
ALBUMIN SERPL-MCNC: 3.9 G/DL (ref 3.4–5)
ALBUMIN/GLOB SERPL: 2 {RATIO} (ref 1.1–2.2)
ALP SERPL-CCNC: 101 U/L (ref 40–129)
ALT SERPL-CCNC: 54 U/L (ref 10–40)
ANION GAP SERPL CALCULATED.3IONS-SCNC: 10 MMOL/L (ref 3–16)
AST SERPL-CCNC: 40 U/L (ref 15–37)
BASOPHILS # BLD: 0.1 K/UL (ref 0–0.2)
BASOPHILS NFR BLD: 0.9 %
BILIRUB SERPL-MCNC: <0.2 MG/DL (ref 0–1)
BUN SERPL-MCNC: 19 MG/DL (ref 7–20)
CALCIUM SERPL-MCNC: 9.5 MG/DL (ref 8.3–10.6)
CHLORIDE SERPL-SCNC: 107 MMOL/L (ref 99–110)
CO2 SERPL-SCNC: 25 MMOL/L (ref 21–32)
CREAT SERPL-MCNC: 1.2 MG/DL (ref 0.8–1.3)
DEPRECATED RDW RBC AUTO: 14.1 % (ref 12.4–15.4)
EOSINOPHIL # BLD: 0.2 K/UL (ref 0–0.6)
EOSINOPHIL NFR BLD: 2.6 %
FERRITIN SERPL IA-MCNC: 169 NG/ML (ref 30–400)
GFR SERPLBLD CREATININE-BSD FMLA CKD-EPI: 62 ML/MIN/{1.73_M2}
GLUCOSE SERPL-MCNC: 105 MG/DL (ref 70–99)
HCT VFR BLD AUTO: 34 % (ref 40.5–52.5)
HGB BLD-MCNC: 11.7 G/DL (ref 13.5–17.5)
IRON SATN MFR SERPL: 22 % (ref 20–50)
IRON SERPL-MCNC: 54 UG/DL (ref 59–158)
LYMPHOCYTES # BLD: 1.8 K/UL (ref 1–5.1)
LYMPHOCYTES NFR BLD: 19.1 %
MCH RBC QN AUTO: 31.1 PG (ref 26–34)
MCHC RBC AUTO-ENTMCNC: 34.5 G/DL (ref 31–36)
MCV RBC AUTO: 90.3 FL (ref 80–100)
MONOCYTES # BLD: 0.6 K/UL (ref 0–1.3)
MONOCYTES NFR BLD: 6.2 %
NEUTROPHILS # BLD: 6.5 K/UL (ref 1.7–7.7)
NEUTROPHILS NFR BLD: 71.2 %
PLATELET # BLD AUTO: 272 K/UL (ref 135–450)
PMV BLD AUTO: 9.4 FL (ref 5–10.5)
POTASSIUM SERPL-SCNC: 5.4 MMOL/L (ref 3.5–5.1)
PROT SERPL-MCNC: 5.9 G/DL (ref 6.4–8.2)
RBC # BLD AUTO: 3.76 M/UL (ref 4.2–5.9)
SODIUM SERPL-SCNC: 142 MMOL/L (ref 136–145)
TIBC SERPL-MCNC: 251 UG/DL (ref 260–445)
WBC # BLD AUTO: 9.2 K/UL (ref 4–11)

## 2025-04-21 PROCEDURE — 1123F ACP DISCUSS/DSCN MKR DOCD: CPT | Performed by: NURSE PRACTITIONER

## 2025-04-21 PROCEDURE — 3078F DIAST BP <80 MM HG: CPT | Performed by: NURSE PRACTITIONER

## 2025-04-21 PROCEDURE — 99213 OFFICE O/P EST LOW 20 MIN: CPT | Performed by: NURSE PRACTITIONER

## 2025-04-21 PROCEDURE — 1159F MED LIST DOCD IN RCRD: CPT | Performed by: NURSE PRACTITIONER

## 2025-04-21 PROCEDURE — 3074F SYST BP LT 130 MM HG: CPT | Performed by: NURSE PRACTITIONER

## 2025-04-21 ASSESSMENT — ENCOUNTER SYMPTOMS: SHORTNESS OF BREATH: 1

## 2025-04-21 NOTE — PROGRESS NOTES
Patient: Leroy Douglass is a 79 y.o. male who presents today with the following Chief Complaint(s):  Chief Complaint   Patient presents with    Follow-up         HPI  HTN: norvasc 5mg- BP stable. D/C'd lisinopril last week.  Feeling better. Still a little SOB on exertion.     Current Outpatient Medications   Medication Sig Dispense Refill    Saw Palmetto, Serenoa repens, (SAW PALMETTO BERRIES) 540 MG CAPS Take by mouth      amLODIPine (NORVASC) 5 MG tablet Take 1 tablet by mouth daily 30 tablet 3    pantoprazole (PROTONIX) 40 MG tablet Take 1 tablet by mouth in the morning and at bedtime 60 tablet 1    Omega-3 Fatty Acids (OMEGA-3 FISH OIL PO) Take by mouth      zinc 50 MG TABS tablet Take 1 tablet by mouth daily      COCONUT OIL PO Take by mouth      GARLIC PO Take 1 tablet by mouth 2 times daily      TURMERIC PO Take 1 tablet by mouth daily      B Complex Vitamins (VITAMIN-B COMPLEX) TABS Take 1 tablet by mouth daily      Coenzyme Q10 (COQ10 PO) Take 1 tablet by mouth 2 times daily      ALPHA LIPOIC ACID PO Take 1 tablet by mouth 2 times daily      Vitamin D (CHOLECALCIFEROL) 1000 UNITS CAPS capsule Take 1 capsule by mouth daily       No current facility-administered medications for this visit.       Patient's past medical history, surgical history, family history, medications,  andallergies  were all reviewed and updated as appropriate today.      Review of Systems   Constitutional:  Negative for chills and fever.   Respiratory:  Positive for shortness of breath (slight).    Cardiovascular:  Negative for chest pain and palpitations.   Neurological:  Negative for dizziness, light-headedness and headaches.         Physical Exam  Vitals and nursing note reviewed.   Constitutional:       Appearance: Normal appearance. He is well-developed.   HENT:      Head: Normocephalic and atraumatic.      Nose: Nose normal.   Eyes:      Conjunctiva/sclera: Conjunctivae normal.   Cardiovascular:      Rate and Rhythm: Normal rate

## 2025-04-21 NOTE — ASSESSMENT & PLAN NOTE
Chronic, at goal (stable), continue current treatment plan  Continue norvasc 5mg daily   Labs drawn today

## 2025-04-22 ENCOUNTER — RESULTS FOLLOW-UP (OUTPATIENT)
Dept: FAMILY MEDICINE CLINIC | Age: 80
End: 2025-04-22

## 2025-04-23 ENCOUNTER — CARE COORDINATION (OUTPATIENT)
Dept: CASE MANAGEMENT | Age: 80
End: 2025-04-23

## 2025-04-23 NOTE — CARE COORDINATION
Care Transitions Note    Follow Up Call     Patient: Leroy Douglass                              Patient : 1945   MRN: [id number]                             Reason for Admission: GI bleed  Discharge Date: 4/15/25       RURS: Readmission Risk Score: 9.4    Patient Current Location:  Home: [address] [place], [place] [id number]    Care Transition Nurse contacted the patient by telephone. Verified name and  as identifiers.    Additional needs identified to be addressed with provider   No needs identified                 Method of communication with provider: none.    Care Summary Note: Patient reports that he is doing well, followed up with PCP and is taking Norvasc 5 mg for hypertension.  He is feeling good and went back to work today.  He is receiving meals from [employer], they will start on Friday.  He denies any questions or concerns at this time.  CTN will continue with outreach follow up calls.    Plan of care updates since last contact:  Education: .  Review of patient management of conditions/medications: .       Advance Care Planning:   Does patient have an Advance Directive: reviewed during previous call, see note. .    Medication Review:  No changes since last call.     Remote Patient Monitoring:  Offered patient enrollment in the Remote Patient Monitoring (RPM) program for in-home monitoring: patient has submitted cost inquiry with [employer], he is waiting for a response..    Assessments:  Care Transitions Subsequent and Final Call    Subsequent and Final Calls  Do you have any ongoing symptoms?: No  Have your medications changed?: No  Do you have any questions related to your medications?: No  Do you currently have any active services?: No  Do you have any needs or concerns that I can assist you with?: No  Identified Barriers: None  Care Transitions Interventions  Other Interventions:              Follow Up Appointment:         Care Transition Nurse provided contact information.  Plan for follow-up

## 2025-04-28 ENCOUNTER — RESULTS FOLLOW-UP (OUTPATIENT)
Dept: GASTROENTEROLOGY | Age: 80
End: 2025-04-28

## 2025-04-30 ENCOUNTER — CARE COORDINATION (OUTPATIENT)
Dept: CASE MANAGEMENT | Age: 80
End: 2025-04-30

## 2025-04-30 NOTE — CARE COORDINATION
Care Transitions Note    Follow Up Call     Patient Current Location:  Home: [address] [place] [address]  [place] [address]    Care Transition Nurse contacted the patient by telephone. Verified name and  as identifiers.    Additional needs identified to be addressed with provider   No needs identified                 Method of communication with provider: none.    Care Summary Note: Patient is back to work, \"getting ready to start work and I am doing fine\".  He denies any questions or concerns at this time.  CTN will close program & remain available.    Plan of care updates since last contact:  Education: .  Review of patient management of conditions/medications: .       Advance Care Planning:   Does patient have an Advance Directive: reviewed during previous call, see note. .    Medication Review:  No changes since last call.     Remote Patient Monitoring:  Offered patient enrollment in the Remote Patient Monitoring (RPM) program for in-home monitoring: declined..    Assessments:  Care Transitions Subsequent and Final Call    Subsequent and Final Calls  Do you have any ongoing symptoms?: No  Have your medications changed?: No  Do you have any questions related to your medications?: No  Do you currently have any active services?: No  Do you have any needs or concerns that I can assist you with?: No  Identified Barriers: None  Care Transitions Interventions  Other Interventions:              Follow Up Appointment:         Care Transition Nurse provided contact information.  No further follow-up call indicated     TRINA WAN RN

## 2025-05-22 ENCOUNTER — ANESTHESIA EVENT (OUTPATIENT)
Dept: ENDOSCOPY | Age: 80
End: 2025-05-22
Payer: MEDICARE

## 2025-06-09 ENCOUNTER — HOSPITAL ENCOUNTER (OUTPATIENT)
Age: 80
Setting detail: OUTPATIENT SURGERY
Discharge: HOME | End: 2025-06-09
Attending: INTERNAL MEDICINE | Admitting: INTERNAL MEDICINE
Payer: MEDICARE

## 2025-06-09 ENCOUNTER — ANESTHESIA (OUTPATIENT)
Dept: ENDOSCOPY | Age: 80
End: 2025-06-09
Payer: MEDICARE

## 2025-06-09 VITALS
SYSTOLIC BLOOD PRESSURE: 129 MMHG | WEIGHT: 196 LBS | HEIGHT: 72 IN | DIASTOLIC BLOOD PRESSURE: 62 MMHG | OXYGEN SATURATION: 98 % | RESPIRATION RATE: 16 BRPM | BODY MASS INDEX: 26.55 KG/M2 | HEART RATE: 58 BPM | TEMPERATURE: 97.7 F

## 2025-06-09 DIAGNOSIS — K26.9 DUODENAL ULCER: ICD-10-CM

## 2025-06-09 LAB
HCT VFR BLD AUTO: 38.8 % (ref 40.5–52.5)
HGB BLD-MCNC: 13.2 G/DL (ref 13.5–17.5)
POTASSIUM SERPL-SCNC: 4.4 MMOL/L (ref 3.5–5.1)

## 2025-06-09 PROCEDURE — 7100000010 HC PHASE II RECOVERY - FIRST 15 MIN: Performed by: INTERNAL MEDICINE

## 2025-06-09 PROCEDURE — 6360000002 HC RX W HCPCS: Performed by: NURSE ANESTHETIST, CERTIFIED REGISTERED

## 2025-06-09 PROCEDURE — 7100000011 HC PHASE II RECOVERY - ADDTL 15 MIN: Performed by: INTERNAL MEDICINE

## 2025-06-09 PROCEDURE — 85014 HEMATOCRIT: CPT

## 2025-06-09 PROCEDURE — 3700000001 HC ADD 15 MINUTES (ANESTHESIA): Performed by: INTERNAL MEDICINE

## 2025-06-09 PROCEDURE — 88305 TISSUE EXAM BY PATHOLOGIST: CPT

## 2025-06-09 PROCEDURE — 2709999900 HC NON-CHARGEABLE SUPPLY: Performed by: INTERNAL MEDICINE

## 2025-06-09 PROCEDURE — 3609017100 HC EGD: Performed by: INTERNAL MEDICINE

## 2025-06-09 PROCEDURE — 85018 HEMOGLOBIN: CPT

## 2025-06-09 PROCEDURE — 88342 IMHCHEM/IMCYTCHM 1ST ANTB: CPT

## 2025-06-09 PROCEDURE — 2580000003 HC RX 258: Performed by: NURSE ANESTHETIST, CERTIFIED REGISTERED

## 2025-06-09 PROCEDURE — 3700000000 HC ANESTHESIA ATTENDED CARE: Performed by: INTERNAL MEDICINE

## 2025-06-09 PROCEDURE — 84132 ASSAY OF SERUM POTASSIUM: CPT

## 2025-06-09 RX ORDER — ONDANSETRON 2 MG/ML
4 INJECTION INTRAMUSCULAR; INTRAVENOUS
Status: CANCELLED | OUTPATIENT
Start: 2025-06-09

## 2025-06-09 RX ORDER — NALOXONE HYDROCHLORIDE 0.4 MG/ML
INJECTION, SOLUTION INTRAMUSCULAR; INTRAVENOUS; SUBCUTANEOUS PRN
Status: CANCELLED | OUTPATIENT
Start: 2025-06-09

## 2025-06-09 RX ORDER — OXYCODONE HYDROCHLORIDE 5 MG/1
10 TABLET ORAL PRN
Refills: 0 | Status: CANCELLED | OUTPATIENT
Start: 2025-06-09 | End: 2025-06-09

## 2025-06-09 RX ORDER — SODIUM CHLORIDE 0.9 % (FLUSH) 0.9 %
5-40 SYRINGE (ML) INJECTION EVERY 12 HOURS SCHEDULED
Status: CANCELLED | OUTPATIENT
Start: 2025-06-09

## 2025-06-09 RX ORDER — GLYCOPYRROLATE 0.2 MG/ML
INJECTION INTRAMUSCULAR; INTRAVENOUS
Status: DISCONTINUED | OUTPATIENT
Start: 2025-06-09 | End: 2025-06-09 | Stop reason: SDUPTHER

## 2025-06-09 RX ORDER — PROPOFOL 10 MG/ML
INJECTION, EMULSION INTRAVENOUS
Status: DISCONTINUED | OUTPATIENT
Start: 2025-06-09 | End: 2025-06-09 | Stop reason: SDUPTHER

## 2025-06-09 RX ORDER — DIPHENHYDRAMINE HYDROCHLORIDE 50 MG/ML
12.5 INJECTION, SOLUTION INTRAMUSCULAR; INTRAVENOUS
Status: CANCELLED | OUTPATIENT
Start: 2025-06-09

## 2025-06-09 RX ORDER — LIDOCAINE HYDROCHLORIDE 20 MG/ML
INJECTION, SOLUTION INFILTRATION; PERINEURAL
Status: DISCONTINUED | OUTPATIENT
Start: 2025-06-09 | End: 2025-06-09 | Stop reason: SDUPTHER

## 2025-06-09 RX ORDER — SODIUM CHLORIDE 0.9 % (FLUSH) 0.9 %
5-40 SYRINGE (ML) INJECTION EVERY 12 HOURS SCHEDULED
Status: DISCONTINUED | OUTPATIENT
Start: 2025-06-09 | End: 2025-06-09 | Stop reason: HOSPADM

## 2025-06-09 RX ORDER — MEPERIDINE HYDROCHLORIDE 25 MG/ML
12.5 INJECTION INTRAMUSCULAR; INTRAVENOUS; SUBCUTANEOUS EVERY 5 MIN PRN
Refills: 0 | Status: CANCELLED | OUTPATIENT
Start: 2025-06-09

## 2025-06-09 RX ORDER — LABETALOL HYDROCHLORIDE 5 MG/ML
5 INJECTION, SOLUTION INTRAVENOUS EVERY 10 MIN PRN
Status: CANCELLED | OUTPATIENT
Start: 2025-06-09

## 2025-06-09 RX ORDER — SODIUM CHLORIDE 9 MG/ML
INJECTION, SOLUTION INTRAVENOUS PRN
Status: CANCELLED | OUTPATIENT
Start: 2025-06-09

## 2025-06-09 RX ORDER — SODIUM CHLORIDE, SODIUM LACTATE, POTASSIUM CHLORIDE, CALCIUM CHLORIDE 600; 310; 30; 20 MG/100ML; MG/100ML; MG/100ML; MG/100ML
INJECTION, SOLUTION INTRAVENOUS
Status: DISCONTINUED | OUTPATIENT
Start: 2025-06-09 | End: 2025-06-09 | Stop reason: SDUPTHER

## 2025-06-09 RX ORDER — SODIUM CHLORIDE 9 MG/ML
INJECTION, SOLUTION INTRAVENOUS PRN
Status: DISCONTINUED | OUTPATIENT
Start: 2025-06-09 | End: 2025-06-09 | Stop reason: HOSPADM

## 2025-06-09 RX ORDER — SODIUM CHLORIDE 0.9 % (FLUSH) 0.9 %
5-40 SYRINGE (ML) INJECTION PRN
Status: CANCELLED | OUTPATIENT
Start: 2025-06-09

## 2025-06-09 RX ORDER — OXYCODONE HYDROCHLORIDE 5 MG/1
5 TABLET ORAL PRN
Refills: 0 | Status: CANCELLED | OUTPATIENT
Start: 2025-06-09 | End: 2025-06-09

## 2025-06-09 RX ORDER — SODIUM CHLORIDE, SODIUM LACTATE, POTASSIUM CHLORIDE, CALCIUM CHLORIDE 600; 310; 30; 20 MG/100ML; MG/100ML; MG/100ML; MG/100ML
INJECTION, SOLUTION INTRAVENOUS CONTINUOUS
Status: DISCONTINUED | OUTPATIENT
Start: 2025-06-09 | End: 2025-06-09 | Stop reason: HOSPADM

## 2025-06-09 RX ORDER — SODIUM CHLORIDE 0.9 % (FLUSH) 0.9 %
5-40 SYRINGE (ML) INJECTION PRN
Status: DISCONTINUED | OUTPATIENT
Start: 2025-06-09 | End: 2025-06-09 | Stop reason: HOSPADM

## 2025-06-09 RX ADMIN — SODIUM CHLORIDE, POTASSIUM CHLORIDE, SODIUM LACTATE AND CALCIUM CHLORIDE: 600; 310; 30; 20 INJECTION, SOLUTION INTRAVENOUS at 11:33

## 2025-06-09 RX ADMIN — PROPOFOL 160 MG: 10 INJECTION, EMULSION INTRAVENOUS at 11:45

## 2025-06-09 RX ADMIN — GLYCOPYRROLATE 0.15 MG: 0.2 INJECTION, SOLUTION INTRAMUSCULAR; INTRAVENOUS at 11:45

## 2025-06-09 RX ADMIN — LIDOCAINE HYDROCHLORIDE 80 MG: 20 INJECTION, SOLUTION INFILTRATION; PERINEURAL at 11:45

## 2025-06-09 ASSESSMENT — PAIN - FUNCTIONAL ASSESSMENT: PAIN_FUNCTIONAL_ASSESSMENT: 0-10

## 2025-06-09 ASSESSMENT — ENCOUNTER SYMPTOMS: SHORTNESS OF BREATH: 1

## 2025-06-09 NOTE — DISCHARGE INSTRUCTIONS
PATIENT INSTRUCTIONS  POST-SEDATION    Leroy Douglass          IMMEDIATELY FOLLOWING PROCEDURE:    Do not drive or operate machinery for the first twenty four hours after surgery.     Do not make any important decisions for twenty four hours after surgery or while taking narcotic pain medications or sedatives.     You should NOT BE LEFT UNATTENDED OR ALONE. A responsible adult should be with you for the rest of the day of your procedure and also during the night for your protection and safety.    You may experience some light headedness. Rest at home with activity as tolerated. You may not need to go to bed, but it is important to rest for the next 24 hours. You should not engage in athletic sports such as basketball, volleyball, jogging, skating, or activities requiring refined motor skills for 24 hours.   If you develop intractable nausea and vomiting or a severe headache please notify your doctor immediately.   You are not expected to have any fever, but if you feel warm, take your temperature. If you have a fever 101 degrees or higher, call your doctor.     If you have had an Endoscopy:   *Eat lightly for your first meal and gradually resume your normal / prescribed diet. DO NOT eat or drink until your gag reflex returns.   *If you have a sore throat you may use lozenges, or salt water gargles.   *If you have had a colonoscopy, do not expect a normal bowel movement for approximately three days due to the cleansing of the large intestine prior to colonoscopy.    ONCE YOU ARE HOME, IF YOU SHOULD HAVE:  Difficulty in breathing, persistent nausea or vomiting, bleeding you feel is excessive, or pain that is unusual, increased abdominal bloating, or any swelling, fever / chills, call your physician. If you cannot contact your physician, but feel that your signs and symptoms need a physician's attention, go to the Emergency Department.      FOLLOW-UP:    Please follow up with your primary care provider as scheduled

## 2025-06-09 NOTE — H&P
Gastroenterology Note             Pre-operative History and Physical    Patient: Leroy Douglass  : 1945  CSN:     History Obtained From:  patient and/or guardian.     HISTORY OF PRESENT ILLNESS:    The patient is a 80 y.o. male  here for EGD    Past Medical History:    Past Medical History:   Diagnosis Date    Arthritis     BPH (benign prostatic hyperplasia)     Cataract     bilateral    Diverticulitis     GERD (gastroesophageal reflux disease)     diverticulitis    HLD (hyperlipidemia) 2013    HTN (hypertension)     Nephrolithiasis     Pruritus     Tear of meniscus of knee     Left    TIA (transient ischemic attack) 2011    Speech Aphasia     Past Surgical History:    Past Surgical History:   Procedure Laterality Date    APPENDECTOMY      CATARACT REMOVAL WITH IMPLANT Left 2018    CATARACT REMOVAL WITH IMPLANT Right 2018    with dr wright     COLONOSCOPY  before     CYSTOSCOPY      Ureteroscopy    EYE SURGERY      HERNIA REPAIR Right     umbilical    INSERTABLE CARDIAC MONITOR  2020    KNEE ARTHROSCOPY      Left    KNEE ARTHROSCOPY  2014    right    MANDIBLE SURGERY      mandibular teresita bilateral    PLANTAR FASCIA SURGERY      PROSTATE SURGERY      SHOULDER ARTHROSCOPY      Left rotator cuff    SKIN BIOPSY      TONSILLECTOMY      TURP      UPPER GASTROINTESTINAL ENDOSCOPY N/A 2025    ESOPHAGOGASTRODUODENOSCOPY [facility]OP[facility]     Medications Prior to Admission:   No current facility-administered medications on file prior to encounter.     Current Outpatient Medications on File Prior to Encounter   Medication Sig Dispense Refill    amLODIPine (NORVASC) 5 MG tablet Take 1 tablet by mouth daily 30 tablet 3    pantoprazole (PROTONIX) 40 MG tablet Take 1 tablet by mouth in the morning and at bedtime 60 tablet 1    Omega-3 Fatty Acids (OMEGA-3 FISH OIL PO) Take by mouth      zinc 50 MG TABS tablet Take 1 tablet by mouth

## 2025-06-09 NOTE — PROGRESS NOTES
Phase II:  1.  Patient is identified using name and the date of birth.  2.  The patient is free from signs and symptoms of chemical, electrical, laser, radiation, positioning, or transfer/transport injury.  3.  The patient receives appropriate medication(s), safely administered during the Perioperative period.  4.  The patient has wound/tissue perfusion consistent with or improved from baseline levels established preoperatively.  5.  The patient is at or returning to normothermia at the conclusion of the immediate postoperative period.  6.  The patient's fluid, electrolyte, and acid base balances are consistent with or improved from baseline levels established preoperatively.  7.  The patient's pulmonary function is consistent with or improved from baseline levels established preoperatively.  8.  The patient's cardiovascular status is consistent with or improved from baseline levels established preoperatively.  9.  The patient/caregiver demonstrates knowledge of nutritional management related to the operative or other invasive procedure.  10.  The patient/caregiver demonstrates knowledge of medication, pain, and wound management.  11.  The patient participates in the rehabilitation process as applicable.  12.  The patient/caregiver participates in decisions affection his or her Perioperative plan of care.  13.  The patient's care is consistent with the individualized Perioperative plan of care.  14.  The patient's right to privacy is maintained.  15.  The patient is the recipient of competent and ethical care within legal standards of practice.  16.  The patient's value system, lifestyle, ethnicity, and culture are considered, respected, and incorporated in the Perioperative plan of care and understands special services available.  17.  The patient demonstrates and/or reports adequate pain control throughout the the Perioperative period.  18.  The patient's neurological status is consistent with or improved from 
Pt arrived to PACU from ENDO in stable condition. Report received from Paz GONSALEZ and Elizabeth RUBY. Phase II. Care of pt transferred at this time. Pt  placed on cont pulse ox and BP. Pt arrived to unit without any oxygen requirements. SPO2 98% on RA.   
given time.  Please no children under the age of 14 in the surgical department.   
phase.  Interventions- orient the patient to the environment, especially the location of the bathroom; provide treaded socks/non-skid footwear; demonstrate and teach back use of the nurse's call system; instruct the patient to call for help before getting out of bed; lock all movable equipment before transferring patient; keep bed in lowest position possible.

## 2025-06-09 NOTE — ANESTHESIA POSTPROCEDURE EVALUATION
Department of Anesthesiology  Postprocedure Note    Patient: Leroy Douglass  MRN: [id number]  YOB: 1945  Date of evaluation: 6/9/2025    Procedure Summary       Date: 06/09/25 Room / Location: [address]    Anesthesia Start: 1141 Anesthesia Stop: 1158    Procedure: EGD W/ANES. Diagnosis:       Duodenal ulcer      (Duodenal ulcer [K26.9])    Surgeons: Lilia Valdes MD Responsible Provider: Snehal Spicer MD    Anesthesia Type: TIVA ASA Status: 3            Anesthesia Type: TIVA    Jesi Phase I: Jesi Score: 10    Jesi Phase II: Jesi Score: 10    Anesthesia Post Evaluation    Comments: Postoperative Anesthesia Note    Name:    Leroy Douglass  MRN:      [id number]    Patient Vitals in the past 12 hrs:  06/09/25 1235, BP:129/62, Temp:97.7 °F (36.5 °C), Temp src:Oral, Pulse:58, Resp:16, SpO2:98 %  06/09/25 1200, BP:(!) 113/58, Temp:97.8 °F (36.6 °C), Temp src:Oral, Pulse:57, Resp:16, SpO2:97 %  06/09/25 0935, BP:(!) 158/77, Temp:96.8 °F (36 °C), Temp src:Infrared, Pulse:66, Resp:16, SpO2:98 %     LABS:    CBC  Lab Results       Component                Value               Date/Time                  WBC                      9.2                 04/21/2025 09:35 AM        HGB                      13.2 (L)            06/09/2025 09:45 AM        HCT                      38.8 (L)            06/09/2025 09:45 AM        PLT                      272                 04/21/2025 09:35 AM   RENAL  Lab Results       Component                Value               Date/Time                  NA                       142                 04/21/2025 09:35 AM        K                        4.4                 06/09/2025 09:45 AM        CL                       107                 04/21/2025 09:35 AM        CO2                      25                  04/21/2025 09:35 AM        BUN                      19                  04/21/2025 09:35 AM        CREATININE               1.2

## 2025-06-09 NOTE — ANESTHESIA PRE PROCEDURE
Department of Anesthesiology  Preprocedure Note       Name:  Leroy Douglass   Age:  80 y.o.  :  1945                                          MRN:  [id number]         Date:  2025      Surgeon: Surgeon(s):  Lilia Valdes MD    Procedure: Procedure(s):  EGD W/ANES.    Medications prior to admission:   Prior to Admission medications    Medication Sig Start Date End Date Taking? Authorizing Provider   amLODIPine (NORVASC) 5 MG tablet Take 1 tablet by mouth daily 25  Yes Susan Valerio APRN - CNP   pantoprazole (PROTONIX) 40 MG tablet Take 1 tablet by mouth in the morning and at bedtime 4/15/25  Yes Henok Cabral MD   Omega-3 Fatty Acids (OMEGA-3 FISH OIL PO) Take by mouth   Yes Jono Eason MD   zinc 50 MG TABS tablet Take 1 tablet by mouth daily   Yes Jono Eason MD   COCONUT OIL PO Take by mouth   Yes Jono Eason MD   GARLIC PO Take 1 tablet by mouth 2 times daily   Yes oJno Eason MD   TURMERIC PO Take 1 tablet by mouth daily   Yes Jono Eason MD   B Complex Vitamins (VITAMIN-B COMPLEX) TABS Take 1 tablet by mouth daily   Yes Jono Eason MD   Coenzyme Q10 (COQ10 PO) Take 1 tablet by mouth 2 times daily   Yes Jono Eason MD   ALPHA LIPOIC ACID PO Take 1 tablet by mouth 2 times daily   Yes Jono Eason MD   Vitamin D (CHOLECALCIFEROL) 1000 UNITS CAPS capsule Take 1 capsule by mouth daily   Yes Provider, Historical, MD   Saw Palmetto, Serenoa repens, (SAW PALMETTO BERRIES) 540 MG CAPS Take by mouth    Jono Eason MD       Current medications:    Current Facility-Administered Medications   Medication Dose Route Frequency Provider Last Rate Last Admin    famotidine (PEPCID) 20 MG/2ML 20 mg in sodium chloride (PF) 0.9 % 10 mL injection  20 mg IntraVENous Once Kevin Mora MD        lactated ringers infusion   IntraVENous Continuous Kevin Mora MD        sodium chloride

## 2025-06-10 RX ORDER — PANTOPRAZOLE SODIUM 40 MG/1
TABLET, DELAYED RELEASE ORAL
Qty: 60 TABLET | Refills: 1 | OUTPATIENT
Start: 2025-06-10

## 2025-06-24 ENCOUNTER — RESULTS FOLLOW-UP (OUTPATIENT)
Dept: GASTROENTEROLOGY | Age: 80
End: 2025-06-24

## 2025-06-30 ENCOUNTER — TELEPHONE (OUTPATIENT)
Dept: PHARMACY | Facility: CLINIC | Age: 80
End: 2025-06-30

## 2025-06-30 NOTE — TELEPHONE ENCOUNTER
[facility] CLINICAL PHARMACY: ADHERENCE REVIEW  Identified care gap per [employer]: fills at [facility]: ACE/ARB adherence    ASSESSMENT    ACE/ARB ADHERENCE    Insurance Records claims through 25 (Prior Year PDC = not reported; YTD PDC = 69%; Potential Fail Date: 25):   LISINOPRIL   TAB 40MG last filled on 25 for 90 day supply. Next refill due: 05.15.25    Prescribed si tablet/capsule daily    Per Insurer Portal: last filled on same    BP Readings from Last 3 Encounters:   25 129/62   25 120/70   25 126/62     Estimated Creatinine Clearance: 55 mL/min (based on SCr of 1.2 mg/dL).  Lab Results   Component Value Date    CREATININE 1.2 2025     Lab Results   Component Value Date    K 4.4 2025       The following are interventions that have been identified:   Patient OVERDUE refilling LISINOPRIL TAB 40MG and active on home medication list.     No patient outreach planned at this time.    Med was dc'd by pcp 25    Last Visit: 25  Next Visit: none      Alejandrina Valle CPhT  [facility] Clinical   [facility]  Toll Free: [phone] Option 1    For Pharmacy Admin Tracking Only    Program: [employer]  CPA in place:  No  Gap Closed?: Yes   Time Spent (min): 10

## 2025-07-28 ENCOUNTER — PATIENT MESSAGE (OUTPATIENT)
Dept: FAMILY MEDICINE CLINIC | Age: 80
End: 2025-07-28

## 2025-07-28 DIAGNOSIS — I10 PRIMARY HYPERTENSION: ICD-10-CM

## 2025-07-28 RX ORDER — AMLODIPINE BESYLATE 5 MG/1
5 TABLET ORAL DAILY
Qty: 90 TABLET | Refills: 3 | Status: SHIPPED | OUTPATIENT
Start: 2025-07-28

## 2025-07-28 RX ORDER — AMLODIPINE BESYLATE 5 MG/1
5 TABLET ORAL DAILY
Qty: 30 TABLET | Refills: 3 | Status: SHIPPED | OUTPATIENT
Start: 2025-07-28 | End: 2025-07-28

## 2025-07-28 NOTE — TELEPHONE ENCOUNTER
LAST REFILL 4-17-25  AMOUNT 30     3 REFILLS  LAST VISIT 5-21-25  NEXT VISIT N/A  Last Medicare AW 3-

## (undated) DEVICE — YANKAUER,BULB TIP,W/O VENT,RIGID,STERILE: Brand: MEDLINE

## (undated) DEVICE — FORCEPS BX PED L160CM JAW DIA1.8MM WRK CHN 2MM W/ NDL DISP

## (undated) DEVICE — CANNULA,OXY,ADULT,SUPERSOFT,W/7'TUB,SC: Brand: MEDLINE INDUSTRIES, INC.

## (undated) DEVICE — CONMED SCOPE SAVER BITE BLOCK, 20X27 MM: Brand: SCOPE SAVER

## (undated) DEVICE — ENDOSCOPIC KIT 2 12 FT OP4 DE2 GWN SYR

## (undated) DEVICE — FORCEP BX STD CAP 240CM RAD JAW 4